# Patient Record
Sex: FEMALE | Race: WHITE | NOT HISPANIC OR LATINO | Employment: OTHER | URBAN - METROPOLITAN AREA
[De-identification: names, ages, dates, MRNs, and addresses within clinical notes are randomized per-mention and may not be internally consistent; named-entity substitution may affect disease eponyms.]

---

## 2020-05-13 ENCOUNTER — TRANSFERRED RECORDS (OUTPATIENT)
Dept: HEALTH INFORMATION MANAGEMENT | Facility: CLINIC | Age: 50
End: 2020-05-13
Payer: COMMERCIAL

## 2020-05-13 LAB
ALT SERPL-CCNC: 64 U/L (ref 6–29)
AST SERPL-CCNC: 58 U/L (ref 10–35)
CREATININE (EXTERNAL): 0.9 MG/DL (ref 0.5–1.1)
GFR ESTIMATED (EXTERNAL): 76 ML/MIN/1.73M2
GFR ESTIMATED (IF AFRICAN AMERICAN) (EXTERNAL): 88 ML/MIN/1.73M2
GLUCOSE (EXTERNAL): 104 MG/DL (ref 65–99)
POTASSIUM (EXTERNAL): 5 MMOL/L (ref 3.5–5.3)

## 2020-11-17 LAB
ALT SERPL-CCNC: 26 U/L (ref 6–29)
AST SERPL-CCNC: 27 U/L (ref 10–35)
CREATININE (EXTERNAL): 0.97 MG/DL (ref 0.5–1.1)
GFR ESTIMATED (EXTERNAL): 69 ML/MIN/1.73M2
GFR ESTIMATED (IF AFRICAN AMERICAN) (EXTERNAL): 80 ML/MIN/1.73M2
GLUCOSE (EXTERNAL): 121 MG/DL (ref 65–99)
POTASSIUM (EXTERNAL): 4.4 MMOL/L (ref 3.5–5.3)

## 2020-11-19 ENCOUNTER — TRANSFERRED RECORDS (OUTPATIENT)
Dept: HEALTH INFORMATION MANAGEMENT | Facility: CLINIC | Age: 50
End: 2020-11-19
Payer: COMMERCIAL

## 2021-01-01 ENCOUNTER — RECORDS - HEALTHEAST (OUTPATIENT)
Dept: ADMINISTRATIVE | Facility: CLINIC | Age: 51
End: 2021-01-01

## 2021-01-01 ENCOUNTER — TRANSFERRED RECORDS (OUTPATIENT)
Dept: HEALTH INFORMATION MANAGEMENT | Facility: CLINIC | Age: 51
End: 2021-01-01
Payer: COMMERCIAL

## 2021-01-01 LAB
ALT SERPL-CCNC: 77 U/L (ref 6–29)
AST SERPL-CCNC: 114 U/L (ref 10–35)
CREATININE (EXTERNAL): 0.96 MG/DL (ref 0.5–1.1)
GFR ESTIMATED (EXTERNAL): 69 ML/MIN/1.73M2
GFR ESTIMATED (IF AFRICAN AMERICAN) (EXTERNAL): 80 ML/MIN/1.73M2
GLUCOSE (EXTERNAL): 100 MG/DL (ref 65–99)
POTASSIUM (EXTERNAL): 4.3 MMOL/L (ref 3.5–5.3)

## 2021-04-27 ENCOUNTER — TRANSFERRED RECORDS (OUTPATIENT)
Dept: HEALTH INFORMATION MANAGEMENT | Facility: CLINIC | Age: 51
End: 2021-04-27
Payer: COMMERCIAL

## 2022-01-01 ENCOUNTER — TELEPHONE (OUTPATIENT)
Dept: NEUROSURGERY | Facility: CLINIC | Age: 52
End: 2022-01-01
Payer: COMMERCIAL

## 2022-01-01 ENCOUNTER — MEDICAL CORRESPONDENCE (OUTPATIENT)
Dept: HEALTH INFORMATION MANAGEMENT | Facility: CLINIC | Age: 52
End: 2022-01-01
Payer: COMMERCIAL

## 2022-01-01 ENCOUNTER — PRE VISIT (OUTPATIENT)
Dept: NEUROSURGERY | Facility: CLINIC | Age: 52
End: 2022-01-01

## 2022-01-01 ENCOUNTER — APPOINTMENT (OUTPATIENT)
Dept: CT IMAGING | Facility: CLINIC | Age: 52
End: 2022-01-01
Attending: EMERGENCY MEDICINE
Payer: COMMERCIAL

## 2022-01-01 ENCOUNTER — TRANSFERRED RECORDS (OUTPATIENT)
Dept: HEALTH INFORMATION MANAGEMENT | Facility: CLINIC | Age: 52
End: 2022-01-01
Payer: COMMERCIAL

## 2022-01-01 ENCOUNTER — APPOINTMENT (OUTPATIENT)
Dept: GENERAL RADIOLOGY | Facility: CLINIC | Age: 52
End: 2022-01-01
Attending: EMERGENCY MEDICINE
Payer: COMMERCIAL

## 2022-01-01 ENCOUNTER — VIRTUAL VISIT (OUTPATIENT)
Dept: NEUROSURGERY | Facility: CLINIC | Age: 52
End: 2022-01-01
Attending: PSYCHIATRY & NEUROLOGY
Payer: COMMERCIAL

## 2022-01-01 ENCOUNTER — TRANSCRIBE ORDERS (OUTPATIENT)
Dept: OTHER | Age: 52
End: 2022-01-01

## 2022-01-01 ENCOUNTER — TELEPHONE (OUTPATIENT)
Dept: OTOLARYNGOLOGY | Facility: CLINIC | Age: 52
End: 2022-01-01
Payer: COMMERCIAL

## 2022-01-01 ENCOUNTER — ANESTHESIA EVENT (OUTPATIENT)
Dept: SURGERY | Facility: CLINIC | Age: 52
End: 2022-01-01
Payer: COMMERCIAL

## 2022-01-01 ENCOUNTER — ANESTHESIA (OUTPATIENT)
Dept: SURGERY | Facility: CLINIC | Age: 52
End: 2022-01-01
Payer: COMMERCIAL

## 2022-01-01 ENCOUNTER — APPOINTMENT (OUTPATIENT)
Dept: CARDIOLOGY | Facility: CLINIC | Age: 52
End: 2022-01-01
Attending: EMERGENCY MEDICINE
Payer: COMMERCIAL

## 2022-01-01 ENCOUNTER — HOSPITAL ENCOUNTER (INPATIENT)
Facility: CLINIC | Age: 52
LOS: 2 days | End: 2022-05-26
Attending: EMERGENCY MEDICINE | Admitting: INTERNAL MEDICINE
Payer: COMMERCIAL

## 2022-01-01 ENCOUNTER — APPOINTMENT (OUTPATIENT)
Dept: ULTRASOUND IMAGING | Facility: CLINIC | Age: 52
End: 2022-01-01
Attending: INTERNAL MEDICINE
Payer: COMMERCIAL

## 2022-01-01 VITALS
WEIGHT: 235.45 LBS | DIASTOLIC BLOOD PRESSURE: 20 MMHG | OXYGEN SATURATION: 97 % | HEIGHT: 58 IN | TEMPERATURE: 100.04 F | RESPIRATION RATE: 14 BRPM | SYSTOLIC BLOOD PRESSURE: 20 MMHG | HEART RATE: 120 BPM | BODY MASS INDEX: 49.42 KG/M2

## 2022-01-01 DIAGNOSIS — G50.0 TRIGEMINAL NEURALGIA: Primary | ICD-10-CM

## 2022-01-01 DIAGNOSIS — G50.1 ATYPICAL FACIAL PAIN: Primary | ICD-10-CM

## 2022-01-01 DIAGNOSIS — I46.9 CARDIAC ARREST (H): ICD-10-CM

## 2022-01-01 DIAGNOSIS — G93.1 ANOXIC BRAIN DAMAGE (H): ICD-10-CM

## 2022-01-01 DIAGNOSIS — S22.42XA CLOSED FRACTURE OF MULTIPLE RIBS OF LEFT SIDE, INITIAL ENCOUNTER: ICD-10-CM

## 2022-01-01 LAB
ABO/RH(D): NORMAL
ALBUMIN SERPL-MCNC: 1.9 G/DL (ref 3.4–5)
ALBUMIN SERPL-MCNC: 2 G/DL (ref 3.4–5)
ALBUMIN SERPL-MCNC: 2 G/DL (ref 3.4–5)
ALBUMIN SERPL-MCNC: 2.2 G/DL (ref 3.4–5)
ALBUMIN SERPL-MCNC: 2.3 G/DL (ref 3.4–5)
ALBUMIN SERPL-MCNC: 2.4 G/DL (ref 3.4–5)
ALBUMIN SERPL-MCNC: 2.5 G/DL (ref 3.4–5)
ALBUMIN SERPL-MCNC: 2.6 G/DL (ref 3.4–5)
ALBUMIN SERPL-MCNC: 2.8 G/DL (ref 3.4–5)
ALBUMIN UR-MCNC: 10 MG/DL
ALBUMIN UR-MCNC: 100 MG/DL
ALBUMIN UR-MCNC: 100 MG/DL
ALP SERPL-CCNC: 110 U/L (ref 40–150)
ALP SERPL-CCNC: 114 U/L (ref 40–150)
ALP SERPL-CCNC: 119 U/L (ref 40–150)
ALP SERPL-CCNC: 120 U/L (ref 40–150)
ALP SERPL-CCNC: 126 U/L (ref 40–150)
ALP SERPL-CCNC: 134 U/L (ref 40–150)
ALP SERPL-CCNC: 136 U/L (ref 40–150)
ALP SERPL-CCNC: 151 U/L (ref 40–150)
ALP SERPL-CCNC: 217 U/L (ref 40–150)
ALT SERPL W P-5'-P-CCNC: 101 U/L (ref 0–50)
ALT SERPL W P-5'-P-CCNC: 1109 U/L (ref 0–50)
ALT SERPL W P-5'-P-CCNC: 1131 U/L (ref 0–50)
ALT SERPL W P-5'-P-CCNC: 1166 U/L (ref 0–50)
ALT SERPL W P-5'-P-CCNC: 119 U/L (ref 0–50)
ALT SERPL W P-5'-P-CCNC: 1230 U/L (ref 0–50)
ALT SERPL W P-5'-P-CCNC: 451 U/L (ref 0–50)
ALT SERPL W P-5'-P-CCNC: 58 U/L (ref 0–50)
ALT SERPL W P-5'-P-CCNC: 833 U/L (ref 0–50)
AMYLASE SERPL-CCNC: 207 U/L (ref 30–110)
AMYLASE SERPL-CCNC: 215 U/L (ref 30–110)
AMYLASE SERPL-CCNC: 240 U/L (ref 30–110)
AMYLASE SERPL-CCNC: 289 U/L (ref 30–110)
AMYLASE SERPL-CCNC: 365 U/L (ref 30–110)
AMYLASE SERPL-CCNC: 514 U/L (ref 30–110)
AMYLASE SERPL-CCNC: 526 U/L (ref 30–110)
ANION GAP SERPL CALCULATED.3IONS-SCNC: 10 MMOL/L (ref 3–14)
ANION GAP SERPL CALCULATED.3IONS-SCNC: 10 MMOL/L (ref 3–14)
ANION GAP SERPL CALCULATED.3IONS-SCNC: 11 MMOL/L (ref 3–14)
ANION GAP SERPL CALCULATED.3IONS-SCNC: 11 MMOL/L (ref 3–14)
ANION GAP SERPL CALCULATED.3IONS-SCNC: 15 MMOL/L (ref 3–14)
ANION GAP SERPL CALCULATED.3IONS-SCNC: 15 MMOL/L (ref 3–14)
ANION GAP SERPL CALCULATED.3IONS-SCNC: 19 MMOL/L (ref 3–14)
ANION GAP SERPL CALCULATED.3IONS-SCNC: 20 MMOL/L (ref 3–14)
ANION GAP SERPL CALCULATED.3IONS-SCNC: 8 MMOL/L (ref 3–14)
ANION GAP SERPL CALCULATED.3IONS-SCNC: 9 MMOL/L (ref 3–14)
ANION GAP SERPL CALCULATED.3IONS-SCNC: 9 MMOL/L (ref 3–14)
ANTIBODY SCREEN: NEGATIVE
APPEARANCE UR: ABNORMAL
APPEARANCE UR: CLEAR
APPEARANCE UR: CLEAR
APTT PPP: 28 SECONDS (ref 22–38)
APTT PPP: 30 SECONDS (ref 22–38)
APTT PPP: 30 SECONDS (ref 22–38)
APTT PPP: 31 SECONDS (ref 22–38)
APTT PPP: 34 SECONDS (ref 22–38)
APTT PPP: 36 SECONDS (ref 22–38)
AST SERPL W P-5'-P-CCNC: 1266 U/L (ref 0–45)
AST SERPL W P-5'-P-CCNC: 137 U/L (ref 0–45)
AST SERPL W P-5'-P-CCNC: 1717 U/L (ref 0–45)
AST SERPL W P-5'-P-CCNC: 1750 U/L (ref 0–45)
AST SERPL W P-5'-P-CCNC: 230 U/L (ref 0–45)
AST SERPL W P-5'-P-CCNC: 64 U/L (ref 0–45)
AST SERPL W P-5'-P-CCNC: 887 U/L (ref 0–45)
AST SERPL W P-5'-P-CCNC: 942 U/L (ref 0–45)
AST SERPL W P-5'-P-CCNC: >1000 U/L (ref 0–45)
ATRIAL RATE - MUSE: 102 BPM
B-HCG SERPL-ACNC: <1 IU/L (ref 0–5)
BACTERIA #/AREA URNS HPF: ABNORMAL /HPF
BACTERIA UR CULT: NO GROWTH
BACTERIA UR CULT: NO GROWTH
BASE EXCESS BLDA CALC-SCNC: -1.6 MMOL/L (ref -9–1.8)
BASE EXCESS BLDA CALC-SCNC: -4 MMOL/L (ref -9–1.8)
BASE EXCESS BLDA CALC-SCNC: -4.1 MMOL/L (ref -9–1.8)
BASE EXCESS BLDA CALC-SCNC: -4.1 MMOL/L (ref -9–1.8)
BASE EXCESS BLDA CALC-SCNC: -8 MMOL/L (ref -9–1.8)
BASE EXCESS BLDA CALC-SCNC: 0.2 MMOL/L (ref -9–1.8)
BASE EXCESS BLDA CALC-SCNC: 2.3 MMOL/L (ref -9–1.8)
BASE EXCESS BLDA CALC-SCNC: 4.1 MMOL/L (ref -9–1.8)
BASE EXCESS BLDA CALC-SCNC: 6.7 MMOL/L (ref -9–1.8)
BASE EXCESS BLDV CALC-SCNC: -7.4 MMOL/L (ref -7.7–1.9)
BASOPHILS # BLD AUTO: 0 10E3/UL (ref 0–0.2)
BASOPHILS # BLD AUTO: 0.1 10E3/UL (ref 0–0.2)
BASOPHILS # BLD MANUAL: 0 10E3/UL (ref 0–0.2)
BASOPHILS NFR BLD AUTO: 0 %
BASOPHILS NFR BLD MANUAL: 0 %
BILIRUB DIRECT SERPL-MCNC: 0.5 MG/DL (ref 0–0.2)
BILIRUB DIRECT SERPL-MCNC: 0.6 MG/DL (ref 0–0.2)
BILIRUB DIRECT SERPL-MCNC: 0.6 MG/DL (ref 0–0.2)
BILIRUB DIRECT SERPL-MCNC: 0.7 MG/DL (ref 0–0.2)
BILIRUB DIRECT SERPL-MCNC: 0.7 MG/DL (ref 0–0.2)
BILIRUB DIRECT SERPL-MCNC: 0.8 MG/DL (ref 0–0.2)
BILIRUB SERPL-MCNC: 0.2 MG/DL (ref 0.2–1.3)
BILIRUB SERPL-MCNC: 0.8 MG/DL (ref 0.2–1.3)
BILIRUB SERPL-MCNC: 1 MG/DL (ref 0.2–1.3)
BILIRUB SERPL-MCNC: 1.1 MG/DL (ref 0.2–1.3)
BILIRUB SERPL-MCNC: 1.2 MG/DL (ref 0.2–1.3)
BILIRUB SERPL-MCNC: 1.3 MG/DL (ref 0.2–1.3)
BILIRUB SERPL-MCNC: 1.4 MG/DL (ref 0.2–1.3)
BILIRUB SERPL-MCNC: 1.6 MG/DL (ref 0.2–1.3)
BILIRUB SERPL-MCNC: 1.7 MG/DL (ref 0.2–1.3)
BILIRUB UR QL STRIP: NEGATIVE
BLD PROD TYP BPU: NORMAL
BLOOD COMPONENT TYPE: NORMAL
BUN SERPL-MCNC: 14 MG/DL (ref 7–30)
BUN SERPL-MCNC: 17 MG/DL (ref 7–30)
BUN SERPL-MCNC: 18 MG/DL (ref 7–30)
BUN SERPL-MCNC: 19 MG/DL (ref 7–30)
BUN SERPL-MCNC: 20 MG/DL (ref 7–30)
BUN SERPL-MCNC: 20 MG/DL (ref 7–30)
BUN SERPL-MCNC: 24 MG/DL (ref 7–30)
BUN SERPL-MCNC: 25 MG/DL (ref 7–30)
BUN SERPL-MCNC: 26 MG/DL (ref 7–30)
CA-I BLD-MCNC: 3.8 MG/DL (ref 4.4–5.2)
CA-I BLD-MCNC: 3.8 MG/DL (ref 4.4–5.2)
CA-I BLD-MCNC: 4 MG/DL (ref 4.4–5.2)
CA-I BLD-MCNC: 4.1 MG/DL (ref 4.4–5.2)
CA-I BLD-MCNC: 4.2 MG/DL (ref 4.4–5.2)
CALCIUM SERPL-MCNC: 10.8 MG/DL (ref 8.5–10.1)
CALCIUM SERPL-MCNC: 7.1 MG/DL (ref 8.5–10.1)
CALCIUM SERPL-MCNC: 7.2 MG/DL (ref 8.5–10.1)
CALCIUM SERPL-MCNC: 7.2 MG/DL (ref 8.5–10.1)
CALCIUM SERPL-MCNC: 7.3 MG/DL (ref 8.5–10.1)
CALCIUM SERPL-MCNC: 8 MG/DL (ref 8.5–10.1)
CALCIUM SERPL-MCNC: 8.6 MG/DL (ref 8.5–10.1)
CHLORIDE BLD-SCNC: 101 MMOL/L (ref 94–109)
CHLORIDE BLD-SCNC: 103 MMOL/L (ref 94–109)
CHLORIDE BLD-SCNC: 105 MMOL/L (ref 94–109)
CHLORIDE BLD-SCNC: 106 MMOL/L (ref 94–109)
CHLORIDE BLD-SCNC: 106 MMOL/L (ref 94–109)
CHLORIDE BLD-SCNC: 107 MMOL/L (ref 94–109)
CHLORIDE BLD-SCNC: 108 MMOL/L (ref 94–109)
CHLORIDE BLD-SCNC: 115 MMOL/L (ref 94–109)
CHLORIDE BLD-SCNC: 99 MMOL/L (ref 94–109)
CK SERPL-CCNC: 1034 U/L (ref 30–225)
CK SERPL-CCNC: 1302 U/L (ref 30–225)
CK SERPL-CCNC: 1668 U/L (ref 30–225)
CK SERPL-CCNC: 2073 U/L (ref 30–225)
CK SERPL-CCNC: 2248 U/L (ref 30–225)
CK SERPL-CCNC: 305 U/L (ref 30–225)
CO2 SERPL-SCNC: 18 MMOL/L (ref 20–32)
CO2 SERPL-SCNC: 18 MMOL/L (ref 20–32)
CO2 SERPL-SCNC: 19 MMOL/L (ref 20–32)
CO2 SERPL-SCNC: 20 MMOL/L (ref 20–32)
CO2 SERPL-SCNC: 21 MMOL/L (ref 20–32)
CO2 SERPL-SCNC: 24 MMOL/L (ref 20–32)
CO2 SERPL-SCNC: 26 MMOL/L (ref 20–32)
CO2 SERPL-SCNC: 27 MMOL/L (ref 20–32)
CODING SYSTEM: NORMAL
COLOR UR AUTO: ABNORMAL
COLOR UR AUTO: ABNORMAL
COLOR UR AUTO: YELLOW
CPB POCT: NO
CREAT SERPL-MCNC: 0.99 MG/DL (ref 0.52–1.04)
CREAT SERPL-MCNC: 1 MG/DL (ref 0.52–1.04)
CREAT SERPL-MCNC: 1.04 MG/DL (ref 0.52–1.04)
CREAT SERPL-MCNC: 1.09 MG/DL (ref 0.52–1.04)
CREAT SERPL-MCNC: 1.13 MG/DL (ref 0.52–1.04)
CREAT SERPL-MCNC: 1.13 MG/DL (ref 0.52–1.04)
CREAT SERPL-MCNC: 1.32 MG/DL (ref 0.52–1.04)
CREAT SERPL-MCNC: 1.33 MG/DL (ref 0.52–1.04)
CREAT SERPL-MCNC: 1.33 MG/DL (ref 0.52–1.04)
CROSSMATCH: NORMAL
DIASTOLIC BLOOD PRESSURE - MUSE: NORMAL MMHG
ENTEROCOCCUS FAECALIS: NOT DETECTED
ENTEROCOCCUS FAECIUM: NOT DETECTED
EOSINOPHIL # BLD AUTO: 0 10E3/UL (ref 0–0.7)
EOSINOPHIL # BLD AUTO: 0 10E3/UL (ref 0–0.7)
EOSINOPHIL # BLD AUTO: 0.1 10E3/UL (ref 0–0.7)
EOSINOPHIL # BLD AUTO: 0.1 10E3/UL (ref 0–0.7)
EOSINOPHIL # BLD AUTO: 0.3 10E3/UL (ref 0–0.7)
EOSINOPHIL # BLD MANUAL: 0 10E3/UL (ref 0–0.7)
EOSINOPHIL # BLD MANUAL: 0 10E3/UL (ref 0–0.7)
EOSINOPHIL # BLD MANUAL: 0.2 10E3/UL (ref 0–0.7)
EOSINOPHIL NFR BLD AUTO: 0 %
EOSINOPHIL NFR BLD AUTO: 1 %
EOSINOPHIL NFR BLD AUTO: 1 %
EOSINOPHIL NFR BLD MANUAL: 0 %
EOSINOPHIL NFR BLD MANUAL: 0 %
EOSINOPHIL NFR BLD MANUAL: 1 %
ERYTHROCYTE [DISTWIDTH] IN BLOOD BY AUTOMATED COUNT: 15.5 % (ref 10–15)
ERYTHROCYTE [DISTWIDTH] IN BLOOD BY AUTOMATED COUNT: 15.5 % (ref 10–15)
ERYTHROCYTE [DISTWIDTH] IN BLOOD BY AUTOMATED COUNT: 15.8 % (ref 10–15)
ERYTHROCYTE [DISTWIDTH] IN BLOOD BY AUTOMATED COUNT: 15.9 % (ref 10–15)
ERYTHROCYTE [DISTWIDTH] IN BLOOD BY AUTOMATED COUNT: 16 % (ref 10–15)
ERYTHROCYTE [DISTWIDTH] IN BLOOD BY AUTOMATED COUNT: 16 % (ref 10–15)
ERYTHROCYTE [DISTWIDTH] IN BLOOD BY AUTOMATED COUNT: 16.5 % (ref 10–15)
ERYTHROCYTE [DISTWIDTH] IN BLOOD BY AUTOMATED COUNT: 16.5 % (ref 10–15)
FIBRINOGEN PPP-MCNC: 355 MG/DL (ref 170–490)
GFR SERPL CREATININE-BSD FRML MDRD: 49 ML/MIN/1.73M2
GFR SERPL CREATININE-BSD FRML MDRD: 59 ML/MIN/1.73M2
GFR SERPL CREATININE-BSD FRML MDRD: 59 ML/MIN/1.73M2
GFR SERPL CREATININE-BSD FRML MDRD: 62 ML/MIN/1.73M2
GFR SERPL CREATININE-BSD FRML MDRD: 66 ML/MIN/1.73M2
GFR SERPL CREATININE-BSD FRML MDRD: 69 ML/MIN/1.73M2
GFR SERPL CREATININE-BSD FRML MDRD: 70 ML/MIN/1.73M2
GGT SERPL-CCNC: 346 U/L (ref 0–40)
GGT SERPL-CCNC: 355 U/L (ref 0–40)
GGT SERPL-CCNC: 357 U/L (ref 0–40)
GGT SERPL-CCNC: 372 U/L (ref 0–40)
GGT SERPL-CCNC: 384 U/L (ref 0–40)
GGT SERPL-CCNC: 389 U/L (ref 0–40)
GLUCOSE BLD-MCNC: 131 MG/DL (ref 70–99)
GLUCOSE BLD-MCNC: 139 MG/DL (ref 70–99)
GLUCOSE BLD-MCNC: 143 MG/DL (ref 70–99)
GLUCOSE BLD-MCNC: 146 MG/DL (ref 70–99)
GLUCOSE BLD-MCNC: 168 MG/DL (ref 70–99)
GLUCOSE BLD-MCNC: 170 MG/DL (ref 70–99)
GLUCOSE BLD-MCNC: 254 MG/DL (ref 70–99)
GLUCOSE BLD-MCNC: 289 MG/DL (ref 70–99)
GLUCOSE BLD-MCNC: 318 MG/DL (ref 70–99)
GLUCOSE BLDC GLUCOMTR-MCNC: 112 MG/DL (ref 70–99)
GLUCOSE BLDC GLUCOMTR-MCNC: 117 MG/DL (ref 70–99)
GLUCOSE BLDC GLUCOMTR-MCNC: 117 MG/DL (ref 70–99)
GLUCOSE BLDC GLUCOMTR-MCNC: 118 MG/DL (ref 70–99)
GLUCOSE BLDC GLUCOMTR-MCNC: 126 MG/DL (ref 70–99)
GLUCOSE BLDC GLUCOMTR-MCNC: 127 MG/DL (ref 70–99)
GLUCOSE BLDC GLUCOMTR-MCNC: 128 MG/DL (ref 70–99)
GLUCOSE BLDC GLUCOMTR-MCNC: 130 MG/DL (ref 70–99)
GLUCOSE BLDC GLUCOMTR-MCNC: 134 MG/DL (ref 70–99)
GLUCOSE BLDC GLUCOMTR-MCNC: 139 MG/DL (ref 70–99)
GLUCOSE BLDC GLUCOMTR-MCNC: 141 MG/DL (ref 70–99)
GLUCOSE BLDC GLUCOMTR-MCNC: 145 MG/DL (ref 70–99)
GLUCOSE BLDC GLUCOMTR-MCNC: 150 MG/DL (ref 70–99)
GLUCOSE BLDC GLUCOMTR-MCNC: 151 MG/DL (ref 70–99)
GLUCOSE BLDC GLUCOMTR-MCNC: 155 MG/DL (ref 70–99)
GLUCOSE BLDC GLUCOMTR-MCNC: 203 MG/DL (ref 70–99)
GLUCOSE BLDC GLUCOMTR-MCNC: 245 MG/DL (ref 70–99)
GLUCOSE BLDC GLUCOMTR-MCNC: 273 MG/DL (ref 70–99)
GLUCOSE BLDC GLUCOMTR-MCNC: 284 MG/DL (ref 70–99)
GLUCOSE BLDC GLUCOMTR-MCNC: 327 MG/DL (ref 70–99)
GLUCOSE UR STRIP-MCNC: 50 MG/DL
GLUCOSE UR STRIP-MCNC: NEGATIVE MG/DL
GLUCOSE UR STRIP-MCNC: NEGATIVE MG/DL
HBA1C MFR BLD: 6.4 % (ref 0–5.6)
HCO3 BLD-SCNC: 19 MMOL/L (ref 21–28)
HCO3 BLD-SCNC: 20 MMOL/L (ref 21–28)
HCO3 BLD-SCNC: 21 MMOL/L (ref 21–28)
HCO3 BLD-SCNC: 21 MMOL/L (ref 21–28)
HCO3 BLD-SCNC: 22 MMOL/L (ref 21–28)
HCO3 BLD-SCNC: 24 MMOL/L (ref 21–28)
HCO3 BLD-SCNC: 25 MMOL/L (ref 21–28)
HCO3 BLD-SCNC: 26 MMOL/L (ref 21–28)
HCO3 BLD-SCNC: 27 MMOL/L (ref 21–28)
HCO3 BLDV-SCNC: 20 MMOL/L (ref 21–28)
HCO3 BLDV-SCNC: 23 MMOL/L (ref 21–28)
HCO3 BLDV-SCNC: ABNORMAL MMOL/L
HCT VFR BLD AUTO: 35.4 % (ref 35–47)
HCT VFR BLD AUTO: 37.1 % (ref 35–47)
HCT VFR BLD AUTO: 38.3 % (ref 35–47)
HCT VFR BLD AUTO: 40.1 % (ref 35–47)
HCT VFR BLD AUTO: 40.4 % (ref 35–47)
HCT VFR BLD AUTO: 41.7 % (ref 35–47)
HCT VFR BLD AUTO: 42.4 % (ref 35–47)
HCT VFR BLD AUTO: 43.9 % (ref 35–47)
HCT VFR BLD CALC: 38 % (ref 35–47)
HGB BLD-MCNC: 11.4 G/DL (ref 11.7–15.7)
HGB BLD-MCNC: 11.5 G/DL (ref 11.7–15.7)
HGB BLD-MCNC: 11.8 G/DL (ref 11.7–15.7)
HGB BLD-MCNC: 11.9 G/DL (ref 11.7–15.7)
HGB BLD-MCNC: 12.9 G/DL (ref 11.7–15.7)
HGB BLD-MCNC: 12.9 G/DL (ref 11.7–15.7)
HGB BLD-MCNC: 13.1 G/DL (ref 11.7–15.7)
HGB BLD-MCNC: 13.6 G/DL (ref 11.7–15.7)
HGB BLD-MCNC: 13.8 G/DL (ref 11.7–15.7)
HGB UR QL STRIP: ABNORMAL
HOLD SPECIMEN: NORMAL
HYALINE CASTS: 2 /LPF
IMM GRANULOCYTES # BLD: 0.1 10E3/UL
IMM GRANULOCYTES # BLD: 0.2 10E3/UL
IMM GRANULOCYTES # BLD: 0.3 10E3/UL
IMM GRANULOCYTES NFR BLD: 1 %
INR PPP: 1.28 (ref 0.85–1.15)
INR PPP: 1.36 (ref 0.85–1.15)
INR PPP: 1.44 (ref 0.85–1.15)
INR PPP: 1.45 (ref 0.85–1.15)
INR PPP: 1.51 (ref 0.85–1.15)
INR PPP: 1.62 (ref 0.85–1.15)
INR PPP: 1.67 (ref 0.85–1.15)
INTERPRETATION ECG - MUSE: NORMAL
KETONES UR STRIP-MCNC: 10 MG/DL
KETONES UR STRIP-MCNC: NEGATIVE MG/DL
KETONES UR STRIP-MCNC: NEGATIVE MG/DL
LACTATE BLD-SCNC: 16.2 MMOL/L
LACTATE SERPL-SCNC: 1.3 MMOL/L (ref 0.7–2)
LACTATE SERPL-SCNC: 1.6 MMOL/L (ref 0.7–2)
LACTATE SERPL-SCNC: 15 MMOL/L (ref 0.7–2)
LACTATE SERPL-SCNC: 2.2 MMOL/L (ref 0.7–2)
LACTATE SERPL-SCNC: 2.2 MMOL/L (ref 0.7–2)
LACTATE SERPL-SCNC: 3 MMOL/L (ref 0.7–2)
LACTATE SERPL-SCNC: 6.1 MMOL/L (ref 0.7–2)
LDH SERPL L TO P-CCNC: 1102 U/L (ref 81–234)
LDH SERPL L TO P-CCNC: 1388 U/L (ref 81–234)
LDH SERPL L TO P-CCNC: 1514 U/L (ref 81–234)
LDH SERPL L TO P-CCNC: 1859 U/L (ref 81–234)
LDH SERPL L TO P-CCNC: 2241 U/L (ref 81–234)
LDH SERPL L TO P-CCNC: 750 U/L (ref 81–234)
LDH SERPL L TO P-CCNC: >1000 U/L (ref 81–234)
LEUKOCYTE ESTERASE UR QL STRIP: NEGATIVE
LIPASE SERPL-CCNC: 101 U/L (ref 73–393)
LIPASE SERPL-CCNC: 109 U/L (ref 73–393)
LIPASE SERPL-CCNC: 115 U/L (ref 73–393)
LIPASE SERPL-CCNC: 141 U/L (ref 73–393)
LIPASE SERPL-CCNC: 148 U/L (ref 73–393)
LIPASE SERPL-CCNC: 247 U/L (ref 73–393)
LIPASE SERPL-CCNC: 268 U/L (ref 73–393)
LISTERIA SPECIES (DETECTED/NOT DETECTED): NOT DETECTED
LVEF ECHO: NORMAL
LYMPHOCYTES # BLD AUTO: 1.2 10E3/UL (ref 0.8–5.3)
LYMPHOCYTES # BLD AUTO: 1.8 10E3/UL (ref 0.8–5.3)
LYMPHOCYTES # BLD AUTO: 2 10E3/UL (ref 0.8–5.3)
LYMPHOCYTES # BLD AUTO: 2.5 10E3/UL (ref 0.8–5.3)
LYMPHOCYTES # BLD AUTO: 2.8 10E3/UL (ref 0.8–5.3)
LYMPHOCYTES # BLD MANUAL: 1.8 10E3/UL (ref 0.8–5.3)
LYMPHOCYTES # BLD MANUAL: 2.3 10E3/UL (ref 0.8–5.3)
LYMPHOCYTES # BLD MANUAL: 6.9 10E3/UL (ref 0.8–5.3)
LYMPHOCYTES NFR BLD AUTO: 10 %
LYMPHOCYTES NFR BLD AUTO: 11 %
LYMPHOCYTES NFR BLD AUTO: 12 %
LYMPHOCYTES NFR BLD AUTO: 13 %
LYMPHOCYTES NFR BLD AUTO: 7 %
LYMPHOCYTES NFR BLD MANUAL: 11 %
LYMPHOCYTES NFR BLD MANUAL: 11 %
LYMPHOCYTES NFR BLD MANUAL: 44 %
MAGNESIUM SERPL-MCNC: 1.5 MG/DL (ref 1.6–2.3)
MAGNESIUM SERPL-MCNC: 1.6 MG/DL (ref 1.6–2.3)
MAGNESIUM SERPL-MCNC: 2.6 MG/DL (ref 1.6–2.3)
MAGNESIUM SERPL-MCNC: 2.6 MG/DL (ref 1.6–2.3)
MAGNESIUM SERPL-MCNC: 2.9 MG/DL (ref 1.6–2.3)
MCH RBC QN AUTO: 29 PG (ref 26.5–33)
MCH RBC QN AUTO: 29 PG (ref 26.5–33)
MCH RBC QN AUTO: 29.1 PG (ref 26.5–33)
MCH RBC QN AUTO: 29.4 PG (ref 26.5–33)
MCH RBC QN AUTO: 29.4 PG (ref 26.5–33)
MCH RBC QN AUTO: 29.5 PG (ref 26.5–33)
MCH RBC QN AUTO: 29.9 PG (ref 26.5–33)
MCH RBC QN AUTO: 30.1 PG (ref 26.5–33)
MCHC RBC AUTO-ENTMCNC: 28.5 G/DL (ref 31.5–36.5)
MCHC RBC AUTO-ENTMCNC: 30.8 G/DL (ref 31.5–36.5)
MCHC RBC AUTO-ENTMCNC: 31.4 G/DL (ref 31.5–36.5)
MCHC RBC AUTO-ENTMCNC: 31.4 G/DL (ref 31.5–36.5)
MCHC RBC AUTO-ENTMCNC: 32.1 G/DL (ref 31.5–36.5)
MCHC RBC AUTO-ENTMCNC: 32.1 G/DL (ref 31.5–36.5)
MCHC RBC AUTO-ENTMCNC: 32.2 G/DL (ref 31.5–36.5)
MCHC RBC AUTO-ENTMCNC: 32.2 G/DL (ref 31.5–36.5)
MCV RBC AUTO: 102 FL (ref 78–100)
MCV RBC AUTO: 92 FL (ref 78–100)
MCV RBC AUTO: 93 FL (ref 78–100)
MCV RBC AUTO: 93 FL (ref 78–100)
MCV RBC AUTO: 94 FL (ref 78–100)
MCV RBC AUTO: 95 FL (ref 78–100)
MONOCYTES # BLD AUTO: 0.5 10E3/UL (ref 0–1.3)
MONOCYTES # BLD AUTO: 0.6 10E3/UL (ref 0–1.3)
MONOCYTES # BLD AUTO: 0.8 10E3/UL (ref 0–1.3)
MONOCYTES # BLD AUTO: 0.8 10E3/UL (ref 0–1.3)
MONOCYTES # BLD AUTO: 1.5 10E3/UL (ref 0–1.3)
MONOCYTES # BLD MANUAL: 0 10E3/UL (ref 0–1.3)
MONOCYTES # BLD MANUAL: 0.2 10E3/UL (ref 0–1.3)
MONOCYTES # BLD MANUAL: 1.1 10E3/UL (ref 0–1.3)
MONOCYTES NFR BLD AUTO: 3 %
MONOCYTES NFR BLD AUTO: 3 %
MONOCYTES NFR BLD AUTO: 4 %
MONOCYTES NFR BLD AUTO: 5 %
MONOCYTES NFR BLD AUTO: 6 %
MONOCYTES NFR BLD MANUAL: 0 %
MONOCYTES NFR BLD MANUAL: 1 %
MONOCYTES NFR BLD MANUAL: 7 %
MUCOUS THREADS #/AREA URNS LPF: PRESENT /LPF
NEUTROPHILS # BLD AUTO: 13.2 10E3/UL (ref 1.6–8.3)
NEUTROPHILS # BLD AUTO: 15.2 10E3/UL (ref 1.6–8.3)
NEUTROPHILS # BLD AUTO: 16 10E3/UL (ref 1.6–8.3)
NEUTROPHILS # BLD AUTO: 16.2 10E3/UL (ref 1.6–8.3)
NEUTROPHILS # BLD AUTO: 18.7 10E3/UL (ref 1.6–8.3)
NEUTROPHILS # BLD MANUAL: 14.7 10E3/UL (ref 1.6–8.3)
NEUTROPHILS # BLD MANUAL: 18.4 10E3/UL (ref 1.6–8.3)
NEUTROPHILS # BLD MANUAL: 7.5 10E3/UL (ref 1.6–8.3)
NEUTROPHILS NFR BLD AUTO: 80 %
NEUTROPHILS NFR BLD AUTO: 83 %
NEUTROPHILS NFR BLD AUTO: 83 %
NEUTROPHILS NFR BLD AUTO: 84 %
NEUTROPHILS NFR BLD AUTO: 89 %
NEUTROPHILS NFR BLD MANUAL: 48 %
NEUTROPHILS NFR BLD MANUAL: 88 %
NEUTROPHILS NFR BLD MANUAL: 89 %
NITRATE UR QL: NEGATIVE
NRBC # BLD AUTO: 0 10E3/UL
NRBC # BLD AUTO: 0.2 10E3/UL
NRBC BLD AUTO-RTO: 0 /100
NRBC BLD MANUAL-RTO: 1 %
O2/TOTAL GAS SETTING VFR VENT: 100 %
O2/TOTAL GAS SETTING VFR VENT: 40 %
O2/TOTAL GAS SETTING VFR VENT: 60 %
OXYHGB MFR BLD: 90 % (ref 92–100)
OXYHGB MFR BLDV: 75 % (ref 70–75)
P AXIS - MUSE: 33 DEGREES
PCO2 BLD: 25 MM HG (ref 35–45)
PCO2 BLD: 25 MM HG (ref 35–45)
PCO2 BLD: 27 MM HG (ref 35–45)
PCO2 BLD: 27 MM HG (ref 35–45)
PCO2 BLD: 28 MM HG (ref 35–45)
PCO2 BLD: 31 MM HG (ref 35–45)
PCO2 BLD: 37 MM HG (ref 35–45)
PCO2 BLD: 47 MM HG (ref 35–45)
PCO2 BLD: 61 MM HG (ref 35–45)
PCO2 BLDV: 71 MM HG (ref 40–50)
PCO2 BLDV: 82 MM HG (ref 40–50)
PCO2 BLDV: 99 MM HG (ref 40–50)
PH BLD: 7.21 [PH] (ref 7.35–7.45)
PH BLD: 7.23 [PH] (ref 7.35–7.45)
PH BLD: 7.36 [PH] (ref 7.35–7.45)
PH BLD: 7.46 [PH] (ref 7.35–7.45)
PH BLD: 7.49 [PH] (ref 7.35–7.45)
PH BLD: 7.54 [PH] (ref 7.35–7.45)
PH BLD: 7.63 [PH] (ref 7.35–7.45)
PH BLDV: 6.99 [PH] (ref 7.32–7.43)
PH BLDV: 7.11 [PH] (ref 7.32–7.43)
PH BLDV: <7 [PH] (ref 7.32–7.43)
PH UR STRIP: 6 [PH] (ref 5–7)
PH UR STRIP: 6.5 [PH] (ref 5–7)
PH UR STRIP: 8.5 [PH] (ref 5–7)
PHOSPHATE SERPL-MCNC: 0.8 MG/DL (ref 2.5–4.5)
PHOSPHATE SERPL-MCNC: 2.4 MG/DL (ref 2.5–4.5)
PHOSPHATE SERPL-MCNC: 2.7 MG/DL (ref 2.5–4.5)
PHOSPHATE SERPL-MCNC: 2.8 MG/DL (ref 2.5–4.5)
PHOSPHATE SERPL-MCNC: 3.4 MG/DL (ref 2.5–4.5)
PHOSPHATE SERPL-MCNC: 4 MG/DL (ref 2.5–4.5)
PLAT MORPH BLD: ABNORMAL
PLATELET # BLD AUTO: 151 10E3/UL (ref 150–450)
PLATELET # BLD AUTO: 161 10E3/UL (ref 150–450)
PLATELET # BLD AUTO: 176 10E3/UL (ref 150–450)
PLATELET # BLD AUTO: 177 10E3/UL (ref 150–450)
PLATELET # BLD AUTO: 180 10E3/UL (ref 150–450)
PLATELET # BLD AUTO: 193 10E3/UL (ref 150–450)
PLATELET # BLD AUTO: 215 10E3/UL (ref 150–450)
PLATELET # BLD AUTO: 241 10E3/UL (ref 150–450)
PO2 BLD: 121 MM HG (ref 80–105)
PO2 BLD: 124 MM HG (ref 80–105)
PO2 BLD: 126 MM HG (ref 80–105)
PO2 BLD: 127 MM HG (ref 80–105)
PO2 BLD: 73 MM HG (ref 80–105)
PO2 BLD: 84 MM HG (ref 80–105)
PO2 BLD: 89 MM HG (ref 80–105)
PO2 BLD: 90 MM HG (ref 80–105)
PO2 BLD: 97 MM HG (ref 80–105)
PO2 BLDV: 57 MM HG (ref 25–47)
PO2 BLDV: 90 MM HG (ref 25–47)
PO2 BLDV: 94 MM HG (ref 25–47)
POTASSIUM BLD-SCNC: 3 MMOL/L (ref 3.4–5.3)
POTASSIUM BLD-SCNC: 3.3 MMOL/L (ref 3.4–5.3)
POTASSIUM BLD-SCNC: 3.4 MMOL/L (ref 3.4–5.3)
POTASSIUM BLD-SCNC: 3.6 MMOL/L (ref 3.4–5.3)
POTASSIUM BLD-SCNC: 3.8 MMOL/L (ref 3.4–5.3)
POTASSIUM BLD-SCNC: 4.4 MMOL/L (ref 3.4–5.3)
POTASSIUM BLD-SCNC: 4.5 MMOL/L (ref 3.4–5.3)
POTASSIUM BLD-SCNC: 5.3 MMOL/L (ref 3.4–5.3)
POTASSIUM BLD-SCNC: 5.5 MMOL/L (ref 3.4–5.3)
POTASSIUM BLD-SCNC: 5.5 MMOL/L (ref 3.4–5.3)
PR INTERVAL - MUSE: 160 MS
PROT SERPL-MCNC: 5.1 G/DL (ref 6.8–8.8)
PROT SERPL-MCNC: 5.2 G/DL (ref 6.8–8.8)
PROT SERPL-MCNC: 5.5 G/DL (ref 6.8–8.8)
PROT SERPL-MCNC: 5.7 G/DL (ref 6.8–8.8)
PROT SERPL-MCNC: 5.7 G/DL (ref 6.8–8.8)
PROT SERPL-MCNC: 5.8 G/DL (ref 6.8–8.8)
PROT SERPL-MCNC: 5.9 G/DL (ref 6.8–8.8)
PROT SERPL-MCNC: 6.3 G/DL (ref 6.8–8.8)
PROT SERPL-MCNC: 6.6 G/DL (ref 6.8–8.8)
QRS DURATION - MUSE: 96 MS
QT - MUSE: 356 MS
QTC - MUSE: 463 MS
R AXIS - MUSE: 18 DEGREES
RBC # BLD AUTO: 3.79 10E6/UL (ref 3.8–5.2)
RBC # BLD AUTO: 3.97 10E6/UL (ref 3.8–5.2)
RBC # BLD AUTO: 3.98 10E6/UL (ref 3.8–5.2)
RBC # BLD AUTO: 4.05 10E6/UL (ref 3.8–5.2)
RBC # BLD AUTO: 4.37 10E6/UL (ref 3.8–5.2)
RBC # BLD AUTO: 4.45 10E6/UL (ref 3.8–5.2)
RBC # BLD AUTO: 4.62 10E6/UL (ref 3.8–5.2)
RBC # BLD AUTO: 4.76 10E6/UL (ref 3.8–5.2)
RBC MORPH BLD: ABNORMAL
RBC URINE: 1 /HPF
RBC URINE: 3 /HPF
RBC URINE: 6 /HPF
SAO2 % BLDV: 91 % (ref 94–100)
SAO2 % BLDV: ABNORMAL %
SARS-COV-2 RNA RESP QL NAA+PROBE: NEGATIVE
SARS-COV-2 RNA RESP QL NAA+PROBE: NEGATIVE
SODIUM BLD-SCNC: 137 MMOL/L (ref 133–144)
SODIUM SERPL-SCNC: 138 MMOL/L (ref 133–144)
SODIUM SERPL-SCNC: 139 MMOL/L (ref 133–144)
SODIUM SERPL-SCNC: 139 MMOL/L (ref 133–144)
SODIUM SERPL-SCNC: 140 MMOL/L (ref 133–144)
SODIUM SERPL-SCNC: 140 MMOL/L (ref 133–144)
SODIUM SERPL-SCNC: 141 MMOL/L (ref 133–144)
SODIUM SERPL-SCNC: 142 MMOL/L (ref 133–144)
SODIUM SERPL-SCNC: 143 MMOL/L (ref 133–144)
SP GR UR STRIP: 1 (ref 1–1.03)
SP GR UR STRIP: 1.02 (ref 1–1.03)
SP GR UR STRIP: 1.02 (ref 1–1.03)
SPECIMEN EXPIRATION DATE: NORMAL
SQUAMOUS EPITHELIAL: 3 /HPF
SQUAMOUS EPITHELIAL: <1 /HPF
STAPHYLOCOCCUS AUREUS: NOT DETECTED
STAPHYLOCOCCUS EPIDERMIDIS: DETECTED
STAPHYLOCOCCUS LUGDUNENSIS: NOT DETECTED
STREPTOCOCCUS AGALACTIAE: NOT DETECTED
STREPTOCOCCUS ANGINOSUS GROUP: NOT DETECTED
STREPTOCOCCUS PNEUMONIAE: NOT DETECTED
STREPTOCOCCUS PYOGENES: NOT DETECTED
STREPTOCOCCUS SPECIES: NOT DETECTED
SYSTOLIC BLOOD PRESSURE - MUSE: NORMAL MMHG
T AXIS - MUSE: 41 DEGREES
TROPONIN I SERPL HS-MCNC: 11 NG/L
TROPONIN I SERPL HS-MCNC: 265 NG/L
TROPONIN I SERPL HS-MCNC: 342 NG/L
TROPONIN T BLD-MCNC: 0.01 UG/L
UNIT ABO/RH: NORMAL
UNIT NUMBER: NORMAL
UNIT STATUS: NORMAL
UNIT TYPE ISBT: 5100
UROBILINOGEN UR STRIP-MCNC: NORMAL MG/DL
VENTRICULAR RATE- MUSE: 102 BPM
WBC # BLD AUTO: 15.6 10E3/UL (ref 4–11)
WBC # BLD AUTO: 16.1 10E3/UL (ref 4–11)
WBC # BLD AUTO: 16.7 10E3/UL (ref 4–11)
WBC # BLD AUTO: 17.1 10E3/UL (ref 4–11)
WBC # BLD AUTO: 19.4 10E3/UL (ref 4–11)
WBC # BLD AUTO: 19.4 10E3/UL (ref 4–11)
WBC # BLD AUTO: 20.7 10E3/UL (ref 4–11)
WBC # BLD AUTO: 23.6 10E3/UL (ref 4–11)
WBC URINE: 0 /HPF
WBC URINE: 14 /HPF
WBC URINE: 3 /HPF

## 2022-01-01 PROCEDURE — 99291 CRITICAL CARE FIRST HOUR: CPT | Performed by: INTERNAL MEDICINE

## 2022-01-01 PROCEDURE — 250N000011 HC RX IP 250 OP 636: Performed by: EMERGENCY MEDICINE

## 2022-01-01 PROCEDURE — 83735 ASSAY OF MAGNESIUM: CPT | Performed by: INTERNAL MEDICINE

## 2022-01-01 PROCEDURE — 96361 HYDRATE IV INFUSION ADD-ON: CPT

## 2022-01-01 PROCEDURE — 96365 THER/PROPH/DIAG IV INF INIT: CPT

## 2022-01-01 PROCEDURE — 99292 CRITICAL CARE ADDL 30 MIN: CPT | Mod: 25 | Performed by: INTERNAL MEDICINE

## 2022-01-01 PROCEDURE — 87149 DNA/RNA DIRECT PROBE: CPT | Performed by: EMERGENCY MEDICINE

## 2022-01-01 PROCEDURE — 82550 ASSAY OF CK (CPK): CPT | Performed by: INTERNAL MEDICINE

## 2022-01-01 PROCEDURE — 999N000158 HC STATISTIC RCP TIME ED VENT EA 10 MIN

## 2022-01-01 PROCEDURE — 93010 ELECTROCARDIOGRAM REPORT: CPT | Performed by: INTERNAL MEDICINE

## 2022-01-01 PROCEDURE — 81001 URINALYSIS AUTO W/SCOPE: CPT | Performed by: EMERGENCY MEDICINE

## 2022-01-01 PROCEDURE — 84100 ASSAY OF PHOSPHORUS: CPT | Performed by: INTERNAL MEDICINE

## 2022-01-01 PROCEDURE — 84132 ASSAY OF SERUM POTASSIUM: CPT | Performed by: INTERNAL MEDICINE

## 2022-01-01 PROCEDURE — 85730 THROMBOPLASTIN TIME PARTIAL: CPT | Performed by: INTERNAL MEDICINE

## 2022-01-01 PROCEDURE — 82150 ASSAY OF AMYLASE: CPT | Performed by: INTERNAL MEDICINE

## 2022-01-01 PROCEDURE — 99204 OFFICE O/P NEW MOD 45 MIN: CPT | Mod: 95 | Performed by: NURSE PRACTITIONER

## 2022-01-01 PROCEDURE — 84155 ASSAY OF PROTEIN SERUM: CPT | Performed by: INTERNAL MEDICINE

## 2022-01-01 PROCEDURE — 999N000157 HC STATISTIC RCP TIME EA 10 MIN

## 2022-01-01 PROCEDURE — 258N000003 HC RX IP 258 OP 636: Performed by: INTERNAL MEDICINE

## 2022-01-01 PROCEDURE — 84484 ASSAY OF TROPONIN QUANT: CPT | Mod: 91 | Performed by: INTERNAL MEDICINE

## 2022-01-01 PROCEDURE — 82248 BILIRUBIN DIRECT: CPT | Performed by: INTERNAL MEDICINE

## 2022-01-01 PROCEDURE — 85025 COMPLETE CBC W/AUTO DIFF WBC: CPT | Performed by: INTERNAL MEDICINE

## 2022-01-01 PROCEDURE — 36556 INSERT NON-TUNNEL CV CATH: CPT

## 2022-01-01 PROCEDURE — 360N000079 HC SURGERY LEVEL 6, PER MIN

## 2022-01-01 PROCEDURE — 250N000011 HC RX IP 250 OP 636

## 2022-01-01 PROCEDURE — 87040 BLOOD CULTURE FOR BACTERIA: CPT | Performed by: INTERNAL MEDICINE

## 2022-01-01 PROCEDURE — 36415 COLL VENOUS BLD VENIPUNCTURE: CPT | Performed by: EMERGENCY MEDICINE

## 2022-01-01 PROCEDURE — 85027 COMPLETE CBC AUTOMATED: CPT | Performed by: INTERNAL MEDICINE

## 2022-01-01 PROCEDURE — 85007 BL SMEAR W/DIFF WBC COUNT: CPT | Performed by: INTERNAL MEDICINE

## 2022-01-01 PROCEDURE — 250N000011 HC RX IP 250 OP 636: Performed by: INTERNAL MEDICINE

## 2022-01-01 PROCEDURE — 250N000012 HC RX MED GY IP 250 OP 636 PS 637: Performed by: INTERNAL MEDICINE

## 2022-01-01 PROCEDURE — 82803 BLOOD GASES ANY COMBINATION: CPT

## 2022-01-01 PROCEDURE — 86923 COMPATIBILITY TEST ELECTRIC: CPT | Performed by: INTERNAL MEDICINE

## 2022-01-01 PROCEDURE — 82803 BLOOD GASES ANY COMBINATION: CPT | Performed by: INTERNAL MEDICINE

## 2022-01-01 PROCEDURE — 80051 ELECTROLYTE PANEL: CPT | Performed by: INTERNAL MEDICINE

## 2022-01-01 PROCEDURE — 96376 TX/PRO/DX INJ SAME DRUG ADON: CPT

## 2022-01-01 PROCEDURE — 83690 ASSAY OF LIPASE: CPT | Performed by: INTERNAL MEDICINE

## 2022-01-01 PROCEDURE — 84484 ASSAY OF TROPONIN QUANT: CPT

## 2022-01-01 PROCEDURE — 82977 ASSAY OF GGT: CPT | Performed by: INTERNAL MEDICINE

## 2022-01-01 PROCEDURE — 84702 CHORIONIC GONADOTROPIN TEST: CPT | Performed by: INTERNAL MEDICINE

## 2022-01-01 PROCEDURE — 258N000003 HC RX IP 258 OP 636: Performed by: EMERGENCY MEDICINE

## 2022-01-01 PROCEDURE — 999N000185 HC STATISTIC TRANSPORT TIME EA 15 MIN

## 2022-01-01 PROCEDURE — 70450 CT HEAD/BRAIN W/O DYE: CPT

## 2022-01-01 PROCEDURE — 81001 URINALYSIS AUTO W/SCOPE: CPT | Performed by: INTERNAL MEDICINE

## 2022-01-01 PROCEDURE — 85610 PROTHROMBIN TIME: CPT | Performed by: INTERNAL MEDICINE

## 2022-01-01 PROCEDURE — 87070 CULTURE OTHR SPECIMN AEROBIC: CPT | Performed by: INTERNAL MEDICINE

## 2022-01-01 PROCEDURE — 94003 VENT MGMT INPAT SUBQ DAY: CPT

## 2022-01-01 PROCEDURE — 85610 PROTHROMBIN TIME: CPT | Performed by: EMERGENCY MEDICINE

## 2022-01-01 PROCEDURE — 93005 ELECTROCARDIOGRAM TRACING: CPT

## 2022-01-01 PROCEDURE — 85027 COMPLETE CBC AUTOMATED: CPT | Performed by: EMERGENCY MEDICINE

## 2022-01-01 PROCEDURE — 3E043XZ INTRODUCTION OF VASOPRESSOR INTO CENTRAL VEIN, PERCUTANEOUS APPROACH: ICD-10-PCS | Performed by: EMERGENCY MEDICINE

## 2022-01-01 PROCEDURE — 80053 COMPREHEN METABOLIC PANEL: CPT | Performed by: EMERGENCY MEDICINE

## 2022-01-01 PROCEDURE — 250N000009 HC RX 250: Performed by: EMERGENCY MEDICINE

## 2022-01-01 PROCEDURE — 93325 DOPPLER ECHO COLOR FLOW MAPG: CPT | Mod: 26 | Performed by: INTERNAL MEDICINE

## 2022-01-01 PROCEDURE — 86901 BLOOD TYPING SEROLOGIC RH(D): CPT | Performed by: EMERGENCY MEDICINE

## 2022-01-01 PROCEDURE — 82805 BLOOD GASES W/O2 SATURATION: CPT | Performed by: EMERGENCY MEDICINE

## 2022-01-01 PROCEDURE — 83036 HEMOGLOBIN GLYCOSYLATED A1C: CPT | Performed by: INTERNAL MEDICINE

## 2022-01-01 PROCEDURE — 200N000001 HC R&B ICU

## 2022-01-01 PROCEDURE — U0003 INFECTIOUS AGENT DETECTION BY NUCLEIC ACID (DNA OR RNA); SEVERE ACUTE RESPIRATORY SYNDROME CORONAVIRUS 2 (SARS-COV-2) (CORONAVIRUS DISEASE [COVID-19]), AMPLIFIED PROBE TECHNIQUE, MAKING USE OF HIGH THROUGHPUT TECHNOLOGIES AS DESCRIBED BY CMS-2020-01-R: HCPCS | Performed by: INTERNAL MEDICINE

## 2022-01-01 PROCEDURE — 82330 ASSAY OF CALCIUM: CPT | Performed by: INTERNAL MEDICINE

## 2022-01-01 PROCEDURE — 93005 ELECTROCARDIOGRAM TRACING: CPT | Mod: 76

## 2022-01-01 PROCEDURE — 83605 ASSAY OF LACTIC ACID: CPT | Performed by: INTERNAL MEDICINE

## 2022-01-01 PROCEDURE — 272N000001 HC OR GENERAL SUPPLY STERILE

## 2022-01-01 PROCEDURE — 83605 ASSAY OF LACTIC ACID: CPT | Performed by: EMERGENCY MEDICINE

## 2022-01-01 PROCEDURE — 31500 INSERT EMERGENCY AIRWAY: CPT

## 2022-01-01 PROCEDURE — 250N000009 HC RX 250: Performed by: INTERNAL MEDICINE

## 2022-01-01 PROCEDURE — 84484 ASSAY OF TROPONIN QUANT: CPT | Performed by: EMERGENCY MEDICINE

## 2022-01-01 PROCEDURE — 99292 CRITICAL CARE ADDL 30 MIN: CPT

## 2022-01-01 PROCEDURE — 96366 THER/PROPH/DIAG IV INF ADDON: CPT

## 2022-01-01 PROCEDURE — 250N000013 HC RX MED GY IP 250 OP 250 PS 637: Performed by: INTERNAL MEDICINE

## 2022-01-01 PROCEDURE — 93325 DOPPLER ECHO COLOR FLOW MAPG: CPT

## 2022-01-01 PROCEDURE — 85730 THROMBOPLASTIN TIME PARTIAL: CPT | Performed by: EMERGENCY MEDICINE

## 2022-01-01 PROCEDURE — 36620 INSERTION CATHETER ARTERY: CPT | Performed by: INTERNAL MEDICINE

## 2022-01-01 PROCEDURE — 76937 US GUIDE VASCULAR ACCESS: CPT

## 2022-01-01 PROCEDURE — 999N000065 XR CHEST PORT 1 VIEW

## 2022-01-01 PROCEDURE — 87086 URINE CULTURE/COLONY COUNT: CPT | Performed by: INTERNAL MEDICINE

## 2022-01-01 PROCEDURE — 85007 BL SMEAR W/DIFF WBC COUNT: CPT | Performed by: EMERGENCY MEDICINE

## 2022-01-01 PROCEDURE — 93308 TTE F-UP OR LMTD: CPT | Mod: 26 | Performed by: INTERNAL MEDICINE

## 2022-01-01 PROCEDURE — C9803 HOPD COVID-19 SPEC COLLECT: HCPCS

## 2022-01-01 PROCEDURE — 72125 CT NECK SPINE W/O DYE: CPT

## 2022-01-01 PROCEDURE — 96375 TX/PRO/DX INJ NEW DRUG ADDON: CPT

## 2022-01-01 PROCEDURE — 250N000012 HC RX MED GY IP 250 OP 636 PS 637: Performed by: EMERGENCY MEDICINE

## 2022-01-01 PROCEDURE — 250N000009 HC RX 250

## 2022-01-01 PROCEDURE — 999N000104 HC STATISTIC NO CHARGE

## 2022-01-01 PROCEDURE — 99231 SBSQ HOSP IP/OBS SF/LOW 25: CPT | Mod: GC | Performed by: PSYCHIATRY & NEUROLOGY

## 2022-01-01 PROCEDURE — 87086 URINE CULTURE/COLONY COUNT: CPT | Performed by: EMERGENCY MEDICINE

## 2022-01-01 PROCEDURE — 85384 FIBRINOGEN ACTIVITY: CPT | Performed by: INTERNAL MEDICINE

## 2022-01-01 PROCEDURE — 83615 LACTATE (LD) (LDH) ENZYME: CPT | Performed by: INTERNAL MEDICINE

## 2022-01-01 PROCEDURE — 76700 US EXAM ABDOM COMPLETE: CPT

## 2022-01-01 PROCEDURE — U0003 INFECTIOUS AGENT DETECTION BY NUCLEIC ACID (DNA OR RNA); SEVERE ACUTE RESPIRATORY SYNDROME CORONAVIRUS 2 (SARS-COV-2) (CORONAVIRUS DISEASE [COVID-19]), AMPLIFIED PROBE TECHNIQUE, MAKING USE OF HIGH THROUGHPUT TECHNOLOGIES AS DESCRIBED BY CMS-2020-01-R: HCPCS | Performed by: EMERGENCY MEDICINE

## 2022-01-01 PROCEDURE — 84450 TRANSFERASE (AST) (SGOT): CPT | Performed by: INTERNAL MEDICINE

## 2022-01-01 PROCEDURE — 94002 VENT MGMT INPAT INIT DAY: CPT

## 2022-01-01 PROCEDURE — 74177 CT ABD & PELVIS W/CONTRAST: CPT

## 2022-01-01 PROCEDURE — 99291 CRITICAL CARE FIRST HOUR: CPT | Mod: 25

## 2022-01-01 PROCEDURE — 36620 INSERTION CATHETER ARTERY: CPT

## 2022-01-01 PROCEDURE — 83605 ASSAY OF LACTIC ACID: CPT

## 2022-01-01 PROCEDURE — 93306 TTE W/DOPPLER COMPLETE: CPT

## 2022-01-01 PROCEDURE — 87077 CULTURE AEROBIC IDENTIFY: CPT | Performed by: EMERGENCY MEDICINE

## 2022-01-01 PROCEDURE — 36620 INSERTION CATHETER ARTERY: CPT | Mod: GC | Performed by: INTERNAL MEDICINE

## 2022-01-01 PROCEDURE — 36415 COLL VENOUS BLD VENIPUNCTURE: CPT | Performed by: INTERNAL MEDICINE

## 2022-01-01 PROCEDURE — 5A1945Z RESPIRATORY VENTILATION, 24-96 CONSECUTIVE HOURS: ICD-10-PCS | Performed by: EMERGENCY MEDICINE

## 2022-01-01 PROCEDURE — 93321 DOPPLER ECHO F-UP/LMTD STD: CPT | Mod: 26 | Performed by: INTERNAL MEDICINE

## 2022-01-01 PROCEDURE — 99291 CRITICAL CARE FIRST HOUR: CPT | Mod: 25 | Performed by: INTERNAL MEDICINE

## 2022-01-01 RX ORDER — AMOXICILLIN 250 MG
1 CAPSULE ORAL 2 TIMES DAILY PRN
Status: DISCONTINUED | OUTPATIENT
Start: 2022-01-01 | End: 2022-05-27 | Stop reason: HOSPADM

## 2022-01-01 RX ORDER — HEPARIN SODIUM 5000 [USP'U]/.5ML
5000 INJECTION, SOLUTION INTRAVENOUS; SUBCUTANEOUS EVERY 8 HOURS
Status: DISCONTINUED | OUTPATIENT
Start: 2022-01-01 | End: 2022-05-27 | Stop reason: HOSPADM

## 2022-01-01 RX ORDER — EPINEPHRINE IN 0.9 % SOD CHLOR 5 MG/250ML
.01-.3 PLASTIC BAG, INJECTION (ML) INTRAVENOUS CONTINUOUS
Status: DISCONTINUED | OUTPATIENT
Start: 2022-01-01 | End: 2022-05-27 | Stop reason: HOSPADM

## 2022-01-01 RX ORDER — CHLORHEXIDINE GLUCONATE ORAL RINSE 1.2 MG/ML
15 SOLUTION DENTAL EVERY 12 HOURS
Status: DISCONTINUED | OUTPATIENT
Start: 2022-01-01 | End: 2022-05-27 | Stop reason: HOSPADM

## 2022-01-01 RX ORDER — AMOXICILLIN 250 MG
2 CAPSULE ORAL 2 TIMES DAILY PRN
Status: DISCONTINUED | OUTPATIENT
Start: 2022-01-01 | End: 2022-05-27 | Stop reason: HOSPADM

## 2022-01-01 RX ORDER — NALOXONE HYDROCHLORIDE 0.4 MG/ML
0.2 INJECTION, SOLUTION INTRAMUSCULAR; INTRAVENOUS; SUBCUTANEOUS
Status: DISCONTINUED | OUTPATIENT
Start: 2022-01-01 | End: 2022-05-27 | Stop reason: HOSPADM

## 2022-01-01 RX ORDER — SODIUM CHLORIDE, SODIUM LACTATE, POTASSIUM CHLORIDE, CALCIUM CHLORIDE 600; 310; 30; 20 MG/100ML; MG/100ML; MG/100ML; MG/100ML
INJECTION, SOLUTION INTRAVENOUS CONTINUOUS
Status: DISCONTINUED | OUTPATIENT
Start: 2022-01-01 | End: 2022-05-27 | Stop reason: HOSPADM

## 2022-01-01 RX ORDER — GABAPENTIN 300 MG/1
900 CAPSULE ORAL 2 TIMES DAILY
COMMUNITY

## 2022-01-01 RX ORDER — PIPERACILLIN SODIUM, TAZOBACTAM SODIUM 4; .5 G/20ML; G/20ML
4.5 INJECTION, POWDER, LYOPHILIZED, FOR SOLUTION INTRAVENOUS ONCE
Status: COMPLETED | OUTPATIENT
Start: 2022-01-01 | End: 2022-01-01

## 2022-01-01 RX ORDER — METOPROLOL TARTRATE 1 MG/ML
5 INJECTION, SOLUTION INTRAVENOUS EVERY 4 HOURS PRN
Status: DISCONTINUED | OUTPATIENT
Start: 2022-01-01 | End: 2022-05-27 | Stop reason: HOSPADM

## 2022-01-01 RX ORDER — NALOXONE HYDROCHLORIDE 0.4 MG/ML
0.2 INJECTION, SOLUTION INTRAMUSCULAR; INTRAVENOUS; SUBCUTANEOUS
Status: DISCONTINUED | OUTPATIENT
Start: 2022-01-01 | End: 2022-01-01

## 2022-01-01 RX ORDER — ACETAMINOPHEN 650 MG/1
650 SUPPOSITORY RECTAL EVERY 4 HOURS PRN
Status: DISCONTINUED | OUTPATIENT
Start: 2022-01-01 | End: 2022-05-27 | Stop reason: HOSPADM

## 2022-01-01 RX ORDER — POTASSIUM CHLORIDE 29.8 MG/ML
20 INJECTION INTRAVENOUS
Status: COMPLETED | OUTPATIENT
Start: 2022-01-01 | End: 2022-01-01

## 2022-01-01 RX ORDER — POTASSIUM CHLORIDE 7.45 MG/ML
10 INJECTION INTRAVENOUS
Status: DISCONTINUED | OUTPATIENT
Start: 2022-01-01 | End: 2022-01-01

## 2022-01-01 RX ORDER — LORAZEPAM 2 MG/ML
1 INJECTION INTRAMUSCULAR
Status: DISCONTINUED | OUTPATIENT
Start: 2022-01-01 | End: 2022-05-27 | Stop reason: HOSPADM

## 2022-01-01 RX ORDER — DILTIAZEM HYDROCHLORIDE 120 MG/1
120 CAPSULE, EXTENDED RELEASE ORAL 2 TIMES DAILY
COMMUNITY
Start: 2021-01-01

## 2022-01-01 RX ORDER — GABAPENTIN 300 MG/1
1200 CAPSULE ORAL AT BEDTIME
COMMUNITY

## 2022-01-01 RX ORDER — IOPAMIDOL 755 MG/ML
98 INJECTION, SOLUTION INTRAVASCULAR ONCE
Status: COMPLETED | OUTPATIENT
Start: 2022-01-01 | End: 2022-01-01

## 2022-01-01 RX ORDER — ATORVASTATIN CALCIUM 20 MG/1
20 TABLET, FILM COATED ORAL DAILY
COMMUNITY
Start: 2022-01-01

## 2022-01-01 RX ORDER — VECURONIUM BROMIDE 1 MG/ML
0.1 INJECTION, POWDER, LYOPHILIZED, FOR SOLUTION INTRAVENOUS
Status: DISCONTINUED | OUTPATIENT
Start: 2022-01-01 | End: 2022-05-27 | Stop reason: HOSPADM

## 2022-01-01 RX ORDER — ACETAMINOPHEN 325 MG/1
650 TABLET ORAL EVERY 4 HOURS PRN
Status: DISCONTINUED | OUTPATIENT
Start: 2022-01-01 | End: 2022-05-27 | Stop reason: HOSPADM

## 2022-01-01 RX ORDER — NICOTINE POLACRILEX 4 MG
15-30 LOZENGE BUCCAL
Status: DISCONTINUED | OUTPATIENT
Start: 2022-01-01 | End: 2022-01-01

## 2022-01-01 RX ORDER — NICOTINE POLACRILEX 4 MG
15-30 LOZENGE BUCCAL
Status: DISCONTINUED | OUTPATIENT
Start: 2022-01-01 | End: 2022-05-27 | Stop reason: HOSPADM

## 2022-01-01 RX ORDER — FENTANYL CITRATE 50 UG/ML
50 INJECTION, SOLUTION INTRAMUSCULAR; INTRAVENOUS EVERY 30 MIN PRN
Status: DISCONTINUED | OUTPATIENT
Start: 2022-01-01 | End: 2022-05-27 | Stop reason: HOSPADM

## 2022-01-01 RX ORDER — LISINOPRIL 20 MG/1
20 TABLET ORAL DAILY
COMMUNITY
Start: 2022-01-01

## 2022-01-01 RX ORDER — ATROPINE SULFATE 10 MG/ML
2 SOLUTION/ DROPS OPHTHALMIC EVERY 4 HOURS PRN
Status: DISCONTINUED | OUTPATIENT
Start: 2022-01-01 | End: 2022-05-27 | Stop reason: HOSPADM

## 2022-01-01 RX ORDER — BENZTROPINE MESYLATE 1 MG/1
1 TABLET ORAL 2 TIMES DAILY
COMMUNITY
Start: 2022-01-01

## 2022-01-01 RX ORDER — NALOXONE HYDROCHLORIDE 0.4 MG/ML
0.1 INJECTION, SOLUTION INTRAMUSCULAR; INTRAVENOUS; SUBCUTANEOUS
Status: DISCONTINUED | OUTPATIENT
Start: 2022-01-01 | End: 2022-05-27 | Stop reason: HOSPADM

## 2022-01-01 RX ORDER — HEPARIN SODIUM 1000 [USP'U]/ML
32000 INJECTION, SOLUTION INTRAVENOUS; SUBCUTANEOUS ONCE
Status: COMPLETED | OUTPATIENT
Start: 2022-01-01 | End: 2022-01-01

## 2022-01-01 RX ORDER — EPINEPHRINE IN 0.9 % SOD CHLOR 5 MG/250ML
.01-.3 PLASTIC BAG, INJECTION (ML) INTRAVENOUS CONTINUOUS
Status: DISCONTINUED | OUTPATIENT
Start: 2022-01-01 | End: 2022-01-01

## 2022-01-01 RX ORDER — PROPOFOL 10 MG/ML
5-75 INJECTION, EMULSION INTRAVENOUS CONTINUOUS
Status: DISCONTINUED | OUTPATIENT
Start: 2022-01-01 | End: 2022-05-27 | Stop reason: HOSPADM

## 2022-01-01 RX ORDER — FENTANYL CITRATE 50 UG/ML
50-100 INJECTION, SOLUTION INTRAMUSCULAR; INTRAVENOUS
Status: DISCONTINUED | OUTPATIENT
Start: 2022-01-01 | End: 2022-05-27 | Stop reason: HOSPADM

## 2022-01-01 RX ORDER — DEXTROSE MONOHYDRATE 25 G/50ML
25-50 INJECTION, SOLUTION INTRAVENOUS
Status: DISCONTINUED | OUTPATIENT
Start: 2022-01-01 | End: 2022-05-27 | Stop reason: HOSPADM

## 2022-01-01 RX ORDER — DEXTROSE MONOHYDRATE 25 G/50ML
25-50 INJECTION, SOLUTION INTRAVENOUS
Status: DISCONTINUED | OUTPATIENT
Start: 2022-01-01 | End: 2022-01-01

## 2022-01-01 RX ORDER — NOREPINEPHRINE BITARTRATE 0.02 MG/ML
.01-.6 INJECTION, SOLUTION INTRAVENOUS CONTINUOUS
Status: DISCONTINUED | OUTPATIENT
Start: 2022-01-01 | End: 2022-05-27 | Stop reason: HOSPADM

## 2022-01-01 RX ORDER — PIPERACILLIN SODIUM, TAZOBACTAM SODIUM 4; .5 G/20ML; G/20ML
4.5 INJECTION, POWDER, LYOPHILIZED, FOR SOLUTION INTRAVENOUS EVERY 6 HOURS
Status: DISCONTINUED | OUTPATIENT
Start: 2022-01-01 | End: 2022-05-27 | Stop reason: HOSPADM

## 2022-01-01 RX ORDER — HEPARIN SODIUM 1000 [USP'U]/ML
32000 INJECTION, SOLUTION INTRAVENOUS; SUBCUTANEOUS ONCE
Status: DISCONTINUED | OUTPATIENT
Start: 2022-01-01 | End: 2022-01-01

## 2022-01-01 RX ORDER — CARBAMAZEPINE 300 MG/1
300 CAPSULE, EXTENDED RELEASE ORAL 2 TIMES DAILY
COMMUNITY
Start: 2020-07-17

## 2022-01-01 RX ORDER — NALOXONE HYDROCHLORIDE 0.4 MG/ML
0.4 INJECTION, SOLUTION INTRAMUSCULAR; INTRAVENOUS; SUBCUTANEOUS
Status: DISCONTINUED | OUTPATIENT
Start: 2022-01-01 | End: 2022-01-01

## 2022-01-01 RX ORDER — VECURONIUM BROMIDE 1 MG/ML
0.05 INJECTION, POWDER, LYOPHILIZED, FOR SOLUTION INTRAVENOUS EVERY 30 MIN PRN
Status: DISCONTINUED | OUTPATIENT
Start: 2022-01-01 | End: 2022-05-27 | Stop reason: HOSPADM

## 2022-01-01 RX ORDER — LORAZEPAM 2 MG/ML
2 INJECTION INTRAMUSCULAR
Status: DISCONTINUED | OUTPATIENT
Start: 2022-01-01 | End: 2022-05-27 | Stop reason: HOSPADM

## 2022-01-01 RX ORDER — HYDROMORPHONE HYDROCHLORIDE 1 MG/ML
.3-.5 INJECTION, SOLUTION INTRAMUSCULAR; INTRAVENOUS; SUBCUTANEOUS
Status: DISCONTINUED | OUTPATIENT
Start: 2022-01-01 | End: 2022-05-27 | Stop reason: HOSPADM

## 2022-01-01 RX ORDER — DEXTROSE MONOHYDRATE 100 MG/ML
INJECTION, SOLUTION INTRAVENOUS CONTINUOUS PRN
Status: DISCONTINUED | OUTPATIENT
Start: 2022-01-01 | End: 2022-05-27 | Stop reason: HOSPADM

## 2022-01-01 RX ORDER — ARIPIPRAZOLE 15 MG/1
15 TABLET ORAL DAILY
COMMUNITY
Start: 2022-01-01

## 2022-01-01 RX ORDER — PHENYLEPHRINE HCL IN 0.9% NACL 50MG/250ML
.1-6 PLASTIC BAG, INJECTION (ML) INTRAVENOUS CONTINUOUS
Status: DISCONTINUED | OUTPATIENT
Start: 2022-01-01 | End: 2022-05-27 | Stop reason: HOSPADM

## 2022-01-01 RX ORDER — AMITRIPTYLINE HYDROCHLORIDE 10 MG/1
10 TABLET ORAL DAILY
COMMUNITY
Start: 2022-01-01

## 2022-01-01 RX ORDER — MAGNESIUM SULFATE HEPTAHYDRATE 40 MG/ML
4 INJECTION, SOLUTION INTRAVENOUS ONCE
Status: COMPLETED | OUTPATIENT
Start: 2022-01-01 | End: 2022-01-01

## 2022-01-01 RX ORDER — MEPERIDINE HYDROCHLORIDE 25 MG/ML
25-50 INJECTION INTRAMUSCULAR; INTRAVENOUS; SUBCUTANEOUS
Status: ACTIVE | OUTPATIENT
Start: 2022-01-01 | End: 2022-01-01

## 2022-01-01 RX ADMIN — SODIUM CHLORIDE, POTASSIUM CHLORIDE, SODIUM LACTATE AND CALCIUM CHLORIDE 1000 ML: 600; 310; 30; 20 INJECTION, SOLUTION INTRAVENOUS at 10:57

## 2022-01-01 RX ADMIN — MINERAL OIL AND PETROLATUM 3.5 G: 150; 830 OINTMENT OPHTHALMIC at 16:05

## 2022-01-01 RX ADMIN — HEPARIN SODIUM 5000 UNITS: 5000 INJECTION, SOLUTION INTRAVENOUS; SUBCUTANEOUS at 18:37

## 2022-01-01 RX ADMIN — PROPOFOL 35 MCG/KG/MIN: 10 INJECTION, EMULSION INTRAVENOUS at 10:40

## 2022-01-01 RX ADMIN — POTASSIUM CHLORIDE 20 MEQ: 29.8 INJECTION, SOLUTION INTRAVENOUS at 06:22

## 2022-01-01 RX ADMIN — Medication 0.3 MCG/KG/MIN: at 19:33

## 2022-01-01 RX ADMIN — SODIUM CHLORIDE, POTASSIUM CHLORIDE, SODIUM LACTATE AND CALCIUM CHLORIDE: 600; 310; 30; 20 INJECTION, SOLUTION INTRAVENOUS at 19:21

## 2022-01-01 RX ADMIN — SODIUM CHLORIDE, POTASSIUM CHLORIDE, SODIUM LACTATE AND CALCIUM CHLORIDE 1000 ML: 600; 310; 30; 20 INJECTION, SOLUTION INTRAVENOUS at 07:17

## 2022-01-01 RX ADMIN — SODIUM CHLORIDE, POTASSIUM CHLORIDE, SODIUM LACTATE AND CALCIUM CHLORIDE 1000 ML: 600; 310; 30; 20 INJECTION, SOLUTION INTRAVENOUS at 03:30

## 2022-01-01 RX ADMIN — FENTANYL CITRATE 50 MCG: 50 INJECTION, SOLUTION INTRAMUSCULAR; INTRAVENOUS at 13:49

## 2022-01-01 RX ADMIN — POTASSIUM CHLORIDE 20 MEQ: 29.8 INJECTION, SOLUTION INTRAVENOUS at 00:33

## 2022-01-01 RX ADMIN — HEPARIN SODIUM 5000 UNITS: 5000 INJECTION, SOLUTION INTRAVENOUS; SUBCUTANEOUS at 00:33

## 2022-01-01 RX ADMIN — PROPOFOL 35 MCG/KG/MIN: 10 INJECTION, EMULSION INTRAVENOUS at 06:45

## 2022-01-01 RX ADMIN — SODIUM PHOSPHATE, MONOBASIC, MONOHYDRATE 15 MMOL: 276; 142 INJECTION, SOLUTION INTRAVENOUS at 11:45

## 2022-01-01 RX ADMIN — Medication 0.26 MCG/KG/MIN: at 19:32

## 2022-01-01 RX ADMIN — SODIUM CHLORIDE, POTASSIUM CHLORIDE, SODIUM LACTATE AND CALCIUM CHLORIDE: 600; 310; 30; 20 INJECTION, SOLUTION INTRAVENOUS at 15:01

## 2022-01-01 RX ADMIN — VANCOMYCIN HYDROCHLORIDE 2000 MG: 5 INJECTION, POWDER, LYOPHILIZED, FOR SOLUTION INTRAVENOUS at 20:57

## 2022-01-01 RX ADMIN — LORAZEPAM 1 MG: 2 INJECTION INTRAMUSCULAR; INTRAVENOUS at 21:21

## 2022-01-01 RX ADMIN — PIPERACILLIN SODIUM AND TAZOBACTAM SODIUM 4.5 G: 4; .5 INJECTION, POWDER, LYOPHILIZED, FOR SOLUTION INTRAVENOUS at 08:31

## 2022-01-01 RX ADMIN — PROPOFOL 35 MCG/KG/MIN: 10 INJECTION, EMULSION INTRAVENOUS at 21:57

## 2022-01-01 RX ADMIN — CHLORHEXIDINE GLUCONATE 15 ML: 1.2 SOLUTION ORAL at 00:57

## 2022-01-01 RX ADMIN — Medication 0.25 MCG/KG/MIN: at 16:36

## 2022-01-01 RX ADMIN — CHLORHEXIDINE GLUCONATE 15 ML: 1.2 SOLUTION ORAL at 07:41

## 2022-01-01 RX ADMIN — MAGNESIUM SULFATE HEPTAHYDRATE 4 G: 40 INJECTION, SOLUTION INTRAVENOUS at 00:18

## 2022-01-01 RX ADMIN — IOPAMIDOL 98 ML: 755 INJECTION, SOLUTION INTRAVENOUS at 18:39

## 2022-01-01 RX ADMIN — PIPERACILLIN SODIUM AND TAZOBACTAM SODIUM 4.5 G: 4; .5 INJECTION, POWDER, LYOPHILIZED, FOR SOLUTION INTRAVENOUS at 15:59

## 2022-01-01 RX ADMIN — POTASSIUM CHLORIDE 20 MEQ: 29.8 INJECTION, SOLUTION INTRAVENOUS at 05:51

## 2022-01-01 RX ADMIN — MINERAL OIL AND PETROLATUM 3.5 G: 150; 830 OINTMENT OPHTHALMIC at 07:41

## 2022-01-01 RX ADMIN — POTASSIUM CHLORIDE 20 MEQ: 29.8 INJECTION, SOLUTION INTRAVENOUS at 14:19

## 2022-01-01 RX ADMIN — SODIUM PHOSPHATE, MONOBASIC, MONOHYDRATE 15 MMOL: 276; 142 INJECTION, SOLUTION INTRAVENOUS at 06:20

## 2022-01-01 RX ADMIN — PIPERACILLIN SODIUM AND TAZOBACTAM SODIUM 4.5 G: 4; .5 INJECTION, POWDER, LYOPHILIZED, FOR SOLUTION INTRAVENOUS at 03:48

## 2022-01-01 RX ADMIN — PROPOFOL 35 MCG/KG/MIN: 10 INJECTION, EMULSION INTRAVENOUS at 12:56

## 2022-01-01 RX ADMIN — PIPERACILLIN SODIUM AND TAZOBACTAM SODIUM 4.5 G: 4; .5 INJECTION, POWDER, LYOPHILIZED, FOR SOLUTION INTRAVENOUS at 19:04

## 2022-01-01 RX ADMIN — SODIUM BICARBONATE 50 MEQ: 84 INJECTION INTRAVENOUS at 18:00

## 2022-01-01 RX ADMIN — PROPOFOL 15 MCG/KG/MIN: 10 INJECTION, EMULSION INTRAVENOUS at 16:53

## 2022-01-01 RX ADMIN — SODIUM CHLORIDE, POTASSIUM CHLORIDE, SODIUM LACTATE AND CALCIUM CHLORIDE 1000 ML: 600; 310; 30; 20 INJECTION, SOLUTION INTRAVENOUS at 05:23

## 2022-01-01 RX ADMIN — PROPOFOL 10 MCG/KG/MIN: 10 INJECTION, EMULSION INTRAVENOUS at 09:02

## 2022-01-01 RX ADMIN — PROPOFOL 35 MCG/KG/MIN: 10 INJECTION, EMULSION INTRAVENOUS at 01:40

## 2022-01-01 RX ADMIN — SODIUM CHLORIDE 94 ML: 900 INJECTION INTRAVENOUS at 18:39

## 2022-01-01 RX ADMIN — CHLORHEXIDINE GLUCONATE 15 ML: 1.2 SOLUTION ORAL at 20:50

## 2022-01-01 RX ADMIN — MINERAL OIL AND PETROLATUM 3.5 G: 150; 830 OINTMENT OPHTHALMIC at 15:57

## 2022-01-01 RX ADMIN — Medication 0.03 MCG/KG/MIN: at 03:51

## 2022-01-01 RX ADMIN — Medication 3 MCG/KG/MIN: at 15:54

## 2022-01-01 RX ADMIN — Medication 0.3 MCG/KG/MIN: at 17:57

## 2022-01-01 RX ADMIN — MINERAL OIL AND PETROLATUM 3.5 G: 150; 830 OINTMENT OPHTHALMIC at 00:33

## 2022-01-01 RX ADMIN — SODIUM BICARBONATE: 84 INJECTION, SOLUTION INTRAVENOUS at 01:42

## 2022-01-01 RX ADMIN — HEPARIN SODIUM 5000 UNITS: 5000 INJECTION, SOLUTION INTRAVENOUS; SUBCUTANEOUS at 09:21

## 2022-01-01 RX ADMIN — POTASSIUM CHLORIDE 20 MEQ: 29.8 INJECTION, SOLUTION INTRAVENOUS at 07:24

## 2022-01-01 RX ADMIN — PIPERACILLIN SODIUM AND TAZOBACTAM SODIUM 4.5 G: 4; .5 INJECTION, POWDER, LYOPHILIZED, FOR SOLUTION INTRAVENOUS at 14:59

## 2022-01-01 RX ADMIN — INSULIN ASPART 1 UNITS: 100 INJECTION, SOLUTION INTRAVENOUS; SUBCUTANEOUS at 19:34

## 2022-01-01 RX ADMIN — HEPARIN SODIUM 32000 UNITS: 1000 INJECTION INTRAVENOUS; SUBCUTANEOUS at 21:16

## 2022-01-01 RX ADMIN — Medication 0.5 MCG/KG/MIN: at 12:26

## 2022-01-01 RX ADMIN — HEPARIN SODIUM 5000 UNITS: 5000 INJECTION, SOLUTION INTRAVENOUS; SUBCUTANEOUS at 16:53

## 2022-01-01 RX ADMIN — CHLORHEXIDINE GLUCONATE 15 ML: 1.2 SOLUTION ORAL at 19:34

## 2022-01-01 RX ADMIN — SODIUM CHLORIDE 4.5 UNITS/HR: 9 INJECTION, SOLUTION INTRAVENOUS at 04:39

## 2022-01-01 RX ADMIN — HEPARIN SODIUM 5000 UNITS: 5000 INJECTION, SOLUTION INTRAVENOUS; SUBCUTANEOUS at 08:30

## 2022-01-01 RX ADMIN — POTASSIUM CHLORIDE 20 MEQ: 29.8 INJECTION, SOLUTION INTRAVENOUS at 06:58

## 2022-01-01 RX ADMIN — PIPERACILLIN SODIUM AND TAZOBACTAM SODIUM 4.5 G: 4; .5 INJECTION, POWDER, LYOPHILIZED, FOR SOLUTION INTRAVENOUS at 02:20

## 2022-01-01 RX ADMIN — SODIUM BICARBONATE 50 MEQ: 84 INJECTION INTRAVENOUS at 17:50

## 2022-01-01 RX ADMIN — PHYTONADIONE 5 MG: 10 INJECTION, EMULSION INTRAMUSCULAR; INTRAVENOUS; SUBCUTANEOUS at 10:53

## 2022-01-01 RX ADMIN — Medication 0.3 MCG/KG/MIN: at 20:44

## 2022-01-01 RX ADMIN — SODIUM PHOSPHATE, MONOBASIC, MONOHYDRATE 15 MMOL: 276; 142 INJECTION, SOLUTION INTRAVENOUS at 07:41

## 2022-01-01 RX ADMIN — HEPARIN SODIUM 5000 UNITS: 5000 INJECTION, SOLUTION INTRAVENOUS; SUBCUTANEOUS at 00:57

## 2022-01-01 RX ADMIN — FENTANYL CITRATE 50 MCG: 50 INJECTION, SOLUTION INTRAMUSCULAR; INTRAVENOUS at 08:28

## 2022-01-01 RX ADMIN — Medication 1 MCG/KG/MIN: at 18:39

## 2022-01-01 RX ADMIN — PROPOFOL 35 MCG/KG/MIN: 10 INJECTION, EMULSION INTRAVENOUS at 17:17

## 2022-01-01 RX ADMIN — POTASSIUM CHLORIDE 20 MEQ: 29.8 INJECTION, SOLUTION INTRAVENOUS at 01:41

## 2022-01-01 RX ADMIN — Medication 0.25 MCG/KG/MIN: at 13:52

## 2022-01-01 RX ADMIN — ACETAMINOPHEN 650 MG: 650 SUPPOSITORY RECTAL at 04:26

## 2022-01-01 RX ADMIN — SODIUM CHLORIDE 5 UNITS: 9 INJECTION, SOLUTION INTRAVENOUS at 18:16

## 2022-01-01 RX ADMIN — PIPERACILLIN SODIUM AND TAZOBACTAM SODIUM 4.5 G: 4; .5 INJECTION, POWDER, LYOPHILIZED, FOR SOLUTION INTRAVENOUS at 09:02

## 2022-01-01 RX ADMIN — INSULIN ASPART 1 UNITS: 100 INJECTION, SOLUTION INTRAVENOUS; SUBCUTANEOUS at 12:32

## 2022-01-01 RX ADMIN — SODIUM BICARBONATE: 84 INJECTION, SOLUTION INTRAVENOUS at 18:59

## 2022-01-01 RX ADMIN — MINERAL OIL AND PETROLATUM 3.5 G: 150; 830 OINTMENT OPHTHALMIC at 09:02

## 2022-01-01 RX ADMIN — PIPERACILLIN SODIUM AND TAZOBACTAM SODIUM 4.5 G: 4; .5 INJECTION, POWDER, LYOPHILIZED, FOR SOLUTION INTRAVENOUS at 20:50

## 2022-01-01 RX ADMIN — POTASSIUM CHLORIDE 20 MEQ: 29.8 INJECTION, SOLUTION INTRAVENOUS at 04:43

## 2022-01-01 RX ADMIN — SODIUM BICARBONATE 50 MEQ: 84 INJECTION INTRAVENOUS at 18:16

## 2022-01-01 RX ADMIN — SODIUM CHLORIDE 3 UNITS/HR: 9 INJECTION, SOLUTION INTRAVENOUS at 10:55

## 2022-01-01 RX ADMIN — CHLORHEXIDINE GLUCONATE 15 ML: 1.2 SOLUTION ORAL at 09:02

## 2022-01-01 RX ADMIN — HYDROMORPHONE HYDROCHLORIDE 0.5 MG: 1 INJECTION, SOLUTION INTRAMUSCULAR; INTRAVENOUS; SUBCUTANEOUS at 21:19

## 2022-01-01 RX ADMIN — SODIUM BICARBONATE 50 MEQ: 84 INJECTION INTRAVENOUS at 17:43

## 2022-01-01 RX ADMIN — SODIUM CHLORIDE 2 UNITS/HR: 9 INJECTION, SOLUTION INTRAVENOUS at 00:38

## 2022-01-01 RX ADMIN — Medication 1 MCG/KG/MIN: at 19:35

## 2022-01-01 RX ADMIN — POTASSIUM CHLORIDE 20 MEQ: 29.8 INJECTION, SOLUTION INTRAVENOUS at 13:12

## 2022-01-01 RX ADMIN — SODIUM CHLORIDE, POTASSIUM CHLORIDE, SODIUM LACTATE AND CALCIUM CHLORIDE: 600; 310; 30; 20 INJECTION, SOLUTION INTRAVENOUS at 12:54

## 2022-01-01 RX ADMIN — Medication 0.07 MCG/KG/MIN: at 08:57

## 2022-01-01 ASSESSMENT — ACTIVITIES OF DAILY LIVING (ADL)
ADLS_ACUITY_SCORE: 43
ADLS_ACUITY_SCORE: 43
ADLS_ACUITY_SCORE: 35
ADLS_ACUITY_SCORE: 43
ADLS_ACUITY_SCORE: 35
ADLS_ACUITY_SCORE: 43

## 2022-03-31 NOTE — TELEPHONE ENCOUNTER
FUTURE VISIT INFORMATION      FUTURE VISIT INFORMATION:    Date: 5/3/2022    Time: 1020am     [place] [facility] [place]:    Referring provider:  Dr. Lujan     Referring providers clinic:  [facility]     Reason for visit/diagnosis  Trigeminal Neuralgia     RECORDS REQUESTED FROM:       Clinic name Comments Records Status Imaging Status   [facility]   Requested  Requested by mail          INternal Dr. Martinez-7/18/2014 Epic No Images                        4/1/2022-Request for images and records faxed to [facility] @ 726am    4/6/22 MV 1.14pm  Imaging disk received from [facility] via mail and sent to  for processing.    4/27/2022-[facility] Records scanned to [facility]MR @ 529am

## 2022-04-28 NOTE — TELEPHONE ENCOUNTER
Lisa returned call.  Lsia thought appointment by Telephone on Monday with Vaishnavi Alarcon NP part of facial pain team would work out great.  Appointment set for 5/2/22 telephone @ 1pm for new Trigeminal neuralgia appointment  Note sent to scheduling, patient has my name and number if needed.

## 2022-04-28 NOTE — TELEPHONE ENCOUNTER
Outbound call to patient, leaving message to please call JOAN CORADO for possible appointment change to Monday 5/2/22.    Please return call to

## 2022-05-02 NOTE — LETTER
"2022       RE: Lias Marshall  [address] [address]     Dear Colleague,    Thank you for referring your patient, Lisa Marshall, to the [facility] NEUROSURGERY [facility]INIC [place] at [facility]. Please see a copy of my visit note below.    Lisa is a 51 year old who is being evaluated via a billable telephone visit.      What phone number would you like to be contacted at? [phone]  How would you like to obtain your AVS? Mail a copy  Phone call duration: 45 minutes    [facility]  Department of Neurosurgery      Name: Lisa Marshall  MRN: [id number]  Age: 51 year old  : 1970  Referring provider: Lisa Lujan  2022      Chief Complaint:   Left sided facial pain    History of Present Illness:  Lisa is a 51-year-old female with a history of hypertension, hyperlipidemia, presenting today with long-term history of left-sided facial pain.  She was evaluated by Dr. Rob in .    Pain initially started in 4159-4081.  She did have a sinus surgery done in .  Patient reports pain on the entire LEFT side of face in V1 through V3 distribution. Pain never cross from one side to the other side of your face. Pain never travel outside of face (i.e. into neck, behind ear, or to the top of head).  Denies autonomic symptoms like facial flushing, eyelid drooping or swelling, eye redness, nasal stuffiness, sweating, and ear fullness.  She does report tearing from bilateral eyes.    Pain is described as throbbing, achy pain like \"getting her tooth pulled without any anesthesia\" and is triggered by cold or hot food or drinks. It is present / with different severity.  Today she rates the pain at 8 out of 10.  She has a history of migraines during her teen years, but no migraines currently.    Previously seen by multiple providers and was recommended lamotrigine, Flexeril, baclofen, opioids.  Patient " currently follows up with Dr. Cordero at [facility] neurological clinic.  She currently takes gabapentin [phone] and Tegretol 300-300.  No major side effects from these medications.    Social History:  She is single, no kids.  She is not working currently.  Denies smoking and recreational drug use.  Social use of alcohol.    Imaging:  MRI brain from 11//2020 is available in PACS.    OTHER IMPORTANT MEDICAL INFORMATION:  Have you been diagnosed with multiple sclerosis?   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 Lujan at [facility]       Past Surgical History:   Procedure Laterality Date     HERNIA REPAIR      ventral hernia repair 2012     SURGICAL HISTORY OF -   1/1990    wisdom teeth     THORACOSCOPIC WEDGE RESECTION LUNG  4/3/2012    Procedure:THORACOSCOPIC WEDGE RESECTION LUNG; RIGHT VIDEO ASSISTED THORACOSCOPY, WEDGE RESECTION; Surgeon:XAVIER GAONA; Location: OR     [facility] NONSPECIFIC PROCEDURE  12/28/06    sinus surgery       Family History   Problem Relation Age of Onset     Hypertension Father      Allergies Father      Hypertension Paternal Grandfather      Lipids Mother      Lipids Maternal Grandfather      Allergies No family hx of      Anesthesia Reaction No family hx of         self     Gynecology No family hx of         self     Neurologic Disorder No family hx of      Lipids Maternal Grandfather      Heart Disease  Maternal Grandmother          of heart attack 1972     Asthma No family hx of         self     Allergies Sister      Anesthesia Reaction Sister      Depression Sister        Social History     Tobacco Use     Smoking status: Never Smoker     Smokeless tobacco: Never Used   Substance Use Topics     Alcohol use: Yes     Comment: social   '    Do you have a pacemaker or a history of heart conditions?   No    Do you take any prescription blood thinners? (examples: Coumadin/Warfarin, Eliquis, Plavix, Lovenox, Pradaxa, Xarelto)   No    Do you take aspirin (ASA, either 81mg or 325mg)?   She currently takes a baby aspirin.    Physical Exam:  N/A (telephone visit)    Assessment:  Atypical left facial pain    Plan:  No medication changes today.  We will review the MRI and will contact the patient with further recommendations.  Patient is a good candidate for multidisciplinary facial pain clinic.    Addendum on 2022: Discussed with and images reviewed with Dr. Patel. There is no compression of cranial nerves to explain patient's current symptoms. We will schedule a visit with our multidisciplinary facial pain team.       Vaishnavi Alarcon CNP  Department of Neurosurgery    I spent 45 minutes on patient care activities related to this encounter on the date of service, including time spent reviewing the chart, obtaining history and examination and in counseling the patient, and in documentation in the electronic medical record.

## 2022-05-02 NOTE — PROGRESS NOTES
"Lisa is a 51 year old who is being evaluated via a billable telephone visit.      What phone number would you like to be contacted at? [phone]  How would you like to obtain your AVS? Mail a copy  Phone call duration: 45 minutes    [facility] Facial Pain [facility]  Department of Neurosurgery      Name: Lisa Marshall  MRN: [id number]  Age: 51 year old  : 1970  Referring provider: Lisa Lujan  2022      Chief Complaint:   Left sided facial pain    History of Present Illness:  Lisa is a 51-year-old female with a history of hypertension, hyperlipidemia, presenting today with long-term history of left-sided facial pain.  She was evaluated by Dr. Rob in .    Pain initially started in [id number].  She did have a sinus surgery done in .  Patient reports pain on the entire LEFT side of face in V1 through V3 distribution. Pain never cross from one side to the other side of your face. Pain never travel outside of face (i.e. into neck, behind ear, or to the top of head).  Denies autonomic symptoms like facial flushing, eyelid drooping or swelling, eye redness, nasal stuffiness, sweating, and ear fullness.  She does report tearing from bilateral eyes.    Pain is described as throbbing, achy pain like \"getting her tooth pulled without any anesthesia\" and is triggered by cold or hot food or drinks. It is present 24/ with different severity.  Today she rates the pain at 8 out of 10.  She has a history of migraines during her teen years, but no migraines currently.    Previously seen by multiple providers and was recommended lamotrigine, Flexeril, baclofen, opioids.  Patient currently follows up with Dr. Cordero at [facility].  She currently takes gabapentin [phone] and Tegretol 300-300.  No major side effects from these medications.    Social History:  She is single, no kids.  She is not working currently.  Denies smoking and recreational drug use.  Social use of " alcohol.    Imaging:  MRI brain from 2020 is available in PACS.    OTHER IMPORTANT MEDICAL INFORMATION:  Have you been diagnosed with multiple sclerosis?   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 Lujan at [facility]       Past Surgical History:   Procedure Laterality Date     HERNIA REPAIR      ventral hernia repair      SURGICAL HISTORY OF -   1990    wisdom teeth     THORACOSCOPIC WEDGE RESECTION LUNG  4/3/2012    Procedure:THORACOSCOPIC WEDGE RESECTION LUNG; RIGHT VIDEO ASSISTED THORACOSCOPY, WEDGE RESECTION; Surgeon:XAVIER GAONA; Location: OR     [facility] NONSPECIFIC PROCEDURE  06    sinus surgery       Family History   Problem Relation Age of Onset     Hypertension Father      Allergies Father      Hypertension Paternal Grandfather      Lipids Mother      Lipids Maternal Grandfather      Allergies No family hx of      Anesthesia Reaction No family hx of         self     Gynecology No family hx of         self     Neurologic Disorder No family hx of      Lipids Maternal Grandfather      Heart Disease Maternal Grandmother          of heart attack 1972     Asthma No family hx of         self     Allergies Sister      Anesthesia Reaction Sister      Depression Sister        Social History     Tobacco Use     Smoking status: Never Smoker     Smokeless tobacco: Never Used   Substance Use Topics     Alcohol use:  Yes     Comment: social   '    Do you have a pacemaker or a history of heart conditions?   No    Do you take any prescription blood thinners? (examples: Coumadin/Warfarin, Eliquis, Plavix, Lovenox, Pradaxa, Xarelto)   No    Do you take aspirin (ASA, either 81mg or 325mg)?   She currently takes a baby aspirin.    Physical Exam:  N/A (telephone visit)    Assessment:  Atypical left facial pain    Plan:  No medication changes today.  We will review the MRI and will contact the patient with further recommendations.  Patient is a good candidate for multidisciplinary facial pain clinic.    Addendum on 5/5/2022: Discussed with and images reviewed with Dr. Patel. There is no compression of cranial nerves to explain patient's current symptoms. We will schedule a visit with our multidisciplinary facial pain team.       Vaishnavi Alarcon CNP  Department of Neurosurgery    I spent 45 minutes on patient care activities related to this encounter on the date of service, including time spent reviewing the chart, obtaining history and examination and in counseling the patient, and in documentation in the electronic medical record.

## 2022-05-02 NOTE — NURSING NOTE
Chief Complaint   Patient presents with     Telephone     Trigeminal Neuralgia         Per preauth team pts insurance is requiring at least 3 PT/Chiro sessions to be completed before procedure. Looking in her chart there does not seem to be 3 completed. Called pt to discuss with pt but no answer, left voice message for her to call clinic back to possible reschedule procedure until she is able to complete 3 sessions.

## 2022-05-20 NOTE — TELEPHONE ENCOUNTER
LVM after being unable to reach patient on multiple attempts. Left direct line for patient to call back and be scheduled in the snf.

## 2022-05-24 PROBLEM — I46.9 CARDIAC ARREST (H): Status: ACTIVE | Noted: 2022-01-01

## 2022-05-24 PROBLEM — G93.1 ANOXIC BRAIN DAMAGE (H): Status: ACTIVE | Noted: 2022-01-01

## 2022-05-24 NOTE — ED PROVIDER NOTES
History   Chief Complaint:  Cardiac arrest     The history is limited by the condition of the patient.    Supplemented by EMS and care everywhere    Lisa Marshall is a 51 year old female with history of Type II diabetes, hypertension, TYRONE and schizophrenia who presents via EMS for cardiac arrest. Per EMS the patient was found face down at her assisted living facility by staff. She was not blue when she was found but they are unsure how long she was out. CPR was given for 10 minutes she was found to be asystole PEA. She was given 3 round of epi and 1 round of bicarb by EMS . She had a pulse after the 3rd round of epi. They noted her blood pressure to be in the 70's. Has iGel airway in place on arrival.    Review of Systems   Unable to perform ROS: Patient unresponsive       Allergies:  Topiramate    Medications:  Albuterol  Elavil  Abilify  Lipitor  Cogentin  Symbicort  Carbarol  Celexa  Dilt-XR  Neurontin  Zestril  Singulair  Trileptal  Simvastatin  Spironolactone  Sumatriptan succinate  Verapamil    Past Medical History:     Hypertension  Hyperlipidemia  Migraine  Asthma  PONV  Trigeminal neuralgia  Anxiety   Ovarian cyst  TMJ dysfunction  Type II diabetes  Schizophrenia   Bilateral nephrolithiasis   TYRONE    Past Surgical History:    Ventral hernia repair  [place] teeth removal  Thoracoscopic wedge resection lung  Sinus surgery   Lung biopsy    Family History:    Father: Hypertension  Mother: Hyperlipidemia    Social History:  The patient presents alone via EMS.  She lives at [facility].     Physical Exam     Patient Vitals for the past 24 hrs:   BP Temp Pulse Resp SpO2 Height Weight   05/24/22 2100 108/40 97.52  F (36.4  C) 85 20 (!) 89 % -- --   05/24/22 2055 109/40 97.52  F (36.4  C) 86 20 91 % -- --   05/24/22 2050 117/41 97.52  F (36.4  C) 87 19 93 % -- --   05/24/22 2045 123/49 97.52  F (36.4  C) 89 19 94 % -- --   05/24/22 2040 123/56 97.52  F (36.4  C) 92 19 94 % -- --   05/24/22  "2036 -- 97.52  F (36.4  C) 93 19 94 % -- --   05/24/22 2035 124/54 97.52  F (36.4  C) 93 20 94 % -- --   05/24/22 2030 133/53 97.52  F (36.4  C) 93 16 94 % -- --   05/24/22 2025 122/55 97.52  F (36.4  C) 93 20 93 % -- --   05/24/22 2020 119/48 97.7  F (36.5  C) 93 22 93 % -- --   05/24/22 2015 114/54 97.52  F (36.4  C) 93 20 92 % -- --   05/24/22 2010 118/48 97.52  F (36.4  C) 93 19 93 % -- --   05/24/22 2005 122/49 97.7  F (36.5  C) 93 19 -- -- --   05/24/22 2000 113/55 97.7  F (36.5  C) 93 19 93 % -- --   05/24/22 1954 118/53 97.52  F (36.4  C) 93 20 93 % -- --   05/24/22 1951 111/44 97.7  F (36.5  C) 93 20 93 % -- --   05/24/22 1948 115/47 97.7  F (36.5  C) 93 19 93 % -- --   05/24/22 1946 -- 97.7  F (36.5  C) 93 21 93 % -- --   05/24/22 1945 105/46 -- 92 22 93 % -- --   05/24/22 1942 (!) 104/37 97.7  F (36.5  C) 93 26 93 % -- --   05/24/22 1939 109/44 97.7  F (36.5  C) 93 20 93 % -- --   05/24/22 1936 107/48 -- 94 20 93 % -- --   05/24/22 1933 107/45 97.7  F (36.5  C) 95 20 94 % -- --   05/24/22 1931 -- 97.7  F (36.5  C) 96 20 94 % -- --   05/24/22 1930 120/49 -- 96 19 94 % -- --   05/24/22 1927 119/51 97.88  F (36.6  C) 97 20 94 % -- --   05/24/22 1910 -- 97.88  F (36.6  C) 101 19 94 % -- --   05/24/22 1906 -- 98.06  F (36.7  C) 103 19 95 % -- --   05/24/22 1900 108/51 -- 104 -- 97 % -- --   05/24/22 1857 107/54 -- 106 -- -- -- --   05/24/22 1832 108/56 -- 109 -- -- -- --   05/24/22 1824 116/46 -- 114 19 96 % -- --   05/24/22 1820 -- -- 117 29 96 % -- --   05/24/22 1819 -- -- 118 20 96 % -- --   05/24/22 1818 111/49 -- 120 16 97 % -- --   05/24/22 1812 113/45 -- (!) 122 16 100 % 1.473 m (4' 10\") 105.7 kg (233 lb)   05/24/22 1809 111/44 -- (!) 123 21 100 % -- --   05/24/22 1806 108/41 -- (!) 122 16 100 % -- --   05/24/22 1803 105/44 -- 118 11 98 % -- --   05/24/22 1758 (!) 81/40 -- 84 15 97 % -- --   05/24/22 1755 (!) 66/27 -- 93 23 -- -- --   05/24/22 1751 (!) 72/33 -- 92 23 (!) 88 % -- --   05/24/22 1750 -- -- " 110 11 94 % -- 85 kg (187 lb 6.3 oz)   05/24/22 1746 -- -- (!) 122 (!) 101 97 % -- --       Physical Exam  Eyes:               Sclera white; Pupils are equal and round  ENT:                External ears and nares normal  CV:                  Rate as above with regular rhythm, joycelyn present, unable to feel pulses on arrival  Resp:               Breath sounds clear and equal bilaterally; igel aiway  GI:                   Abdomen is soft, non-distended  MS:                  No movement  Skin:                Warm and dry, not mottled  Neuro:             GCS 3    Emergency Department Course   ECG 1  ECG taken at 1747, ECG read at 1747  Sinus tachycardia  Nonspecific ST abnormality  Subendocardial injury   Subendocardial injury new as compared to prior, dated 11/11/2008.  Rate 121 bpm. KY interval 164 ms. QRS duration 104 ms. QT/QTc 310/440 ms. P-R-T axes 33 -10 39.     ECG  ECG taken at 1751, ECG read at 1752  Sinus tachycardia  Marked ST abnormality, possible inferior subendocardial injury  Abnormal ECG   No significant change as compared to first ECG.  Rate 102 bpm. KY interval 160 ms. QRS duration 96 ms. QT/QTc 356/463 ms. P-R-T axes 33 18 41.         Imaging:  XR Chest Port 1 View   Final Result   IMPRESSION:       Interval retraction of the endotracheal tube, with tip 1.9 cm above the symone. New enteric tube coursing into the stomach with tip out of view. New right IJ catheter with tip in the mid SVC.      Otherwise, no significant interval change.      CT Chest (PE) Abdomen Pelvis w Contrast   Final Result   IMPRESSION:   1.  No PE.   2.  The heart is enlarged.   3.  Tracheomalacia is noted.   4.  Nondisplaced fractures of the left anterior third and fifth ribs.    5.  Pulmonary opacities could be atelectasis or aspiration.       Cervical spine CT w/o contrast   Final Result   IMPRESSION:   1.  No fracture or posttraumatic subluxation.   2.  No high-grade spinal canal or neural foraminal stenosis.   3.   Straightening of the usual cervical lordosis.      Head CT w/o contrast   Final Result   IMPRESSION:   1.  Moderate diffuse low-density changes to the brain parenchyma worrisome for diffuse acute anoxic injury. There is loss of gray-white differentiation in the bilateral corpus stratum.   2.  No hemorrhage identified.   3.  Chronic infarct right parietal lobe.       Exam discussed with Dr. Garland at 19:09 hours         XR Chest Port 1 View   Final Result   IMPRESSION: The endotracheal tube appears to be just above the symone. Recommend retracting 4 cm for optimal positioning. Heterogeneous pulmonary opacities are new and could be an infectious or inflammatory process. The cardiomediastinal silhouette is    enlarged and is not well assessed. There is a large amount of air in the left upper quadrant; this may be in the stomach.       Discussed with Dr. Garland by Dr. Fahrner over telephone at 6:17 PM.       Echo Limited    (Results Pending)   POC US GUIDANCE NEEDLE PLACEMENT    (Results Pending)   XR Chest Port 1 View    (Results Pending)   Echocardiogram Complete    (Results Pending)     Report per radiology    Laboratory:  Labs Ordered and Resulted from Time of ED Arrival to Time of ED Departure   COMPREHENSIVE METABOLIC PANEL - Abnormal       Result Value    Sodium 138      Potassium 5.5 (*)     Chloride 101      Carbon Dioxide (CO2) 18 (*)     Anion Gap 19 (*)     Urea Nitrogen 25      Creatinine 1.32 (*)     Calcium 10.8 (*)     Glucose 254 (*)     Alkaline Phosphatase 119      AST 64 (*)     ALT 58 (*)     Protein Total 5.8 (*)     Albumin 2.5 (*)     Bilirubin Total 0.2      GFR Estimate 49 (*)    LACTIC ACID WHOLE BLOOD - Abnormal    Lactic Acid 15.0 (*)    INR - Abnormal    INR 1.36 (*)    CBC WITH PLATELETS AND DIFFERENTIAL - Abnormal    WBC Count 15.6 (*)     RBC Count 3.97      Hemoglobin 11.5 (*)     Hematocrit 40.4       (*)     MCH 29.0      MCHC 28.5 (*)     RDW 15.5 (*)     Platelet Count 180      GLUCOSE BY METER - Abnormal    GLUCOSE BY METER POCT 245 (*)    ISTAT GASES ELECTROLYTES VENOUS POCT - Abnormal    CPB Applied No      Hematocrit POCT 38      Bicarbonate Venous POCT 20 (*)     Hemoglobin POCT 12.9      Potassium POCT 5.5 (*)     Sodium POCT 137      pCO2 Venous POCT 82 (*)     pH Venous POCT 6.99 (*)     pO2 Venous POCT 94 (*)     O2 Sat, Venous POCT 91 (*)    ROUTINE UA WITH MICROSCOPIC - Abnormal    Color Urine Yellow      Appearance Urine Slightly Cloudy (*)     Glucose Urine Negative      Bilirubin Urine Negative      Ketones Urine Negative      Specific Gravity Urine 1.025      Blood Urine Trace (*)     pH Urine 6.0      Protein Albumin Urine 100  (*)     Urobilinogen Urine Normal      Nitrite Urine Negative      Leukocyte Esterase Urine Negative      Bacteria Urine Many (*)     Mucus Urine Present (*)     RBC Urine 6 (*)     WBC Urine 14 (*)     Squamous Epithelials Urine 3 (*)     Hyaline Casts Urine 2     BLOOD GAS VENOUS WITH OXYHEMOGLOBIN - Abnormal    pH Venous 7.11 (*)     pCO2 Venous 71 (*)     pO2 Venous 57 (*)     Bicarbonate Venous 23      FIO2 100      Oxyhemoglobin Venous 75      Base Excess/Deficit (+/-) -7.4     DIFFERENTIAL - Abnormal    % Neutrophils 48      % Lymphocytes 44      % Monocytes 7      % Eosinophils 1      % Basophils 0      Absolute Neutrophils 7.5      Absolute Lymphocytes 6.9 (*)     Absolute Monocytes 1.1      Absolute Eosinophils 0.2      Absolute Basophils 0.0      RBC Morphology Confirmed RBC Indices      Platelet Assessment        Value: Automated Count Confirmed. Platelet morphology is normal.   BLOOD GAS ARTERIAL WITH OXYHEMOGLOBIN - Abnormal    pH Arterial 7.23 (*)     pCO2 Arterial 47 (*)     pO2 Arterial 73 (*)     Bicarbonate Arterial 20 (*)     Oxyhemoglobin Arterial 90 (*)     Base Excess/Deficit (+/-) -8.0      FIO2 60     TROPONIN I - Normal    Troponin I High Sensitivity 11     PARTIAL THROMBOPLASTIN TIME - Normal    aPTT 30     ISTAT TROPONIN  POCT - Normal    TROPPC POCT 0.01     COVID-19 VIRUS (CORONAVIRUS) BY PCR - Normal    SARS CoV2 PCR Negative     TROPONIN I   GLUCOSE MONITOR NURSING POCT   CBC WITH PLATELETS   COMPREHENSIVE METABOLIC PANEL   BLOOD GAS ARTERIAL WITH OXYHEMOGLOBIN   TROPONIN I   CK TOTAL   TYPE AND SCREEN, ADULT    ABO/RH(D) O POS      Antibody Screen Negative      SPECIMEN EXPIRATION DATE [id number]     BLOOD CULTURE   BLOOD CULTURE   URINE CULTURE   ABO/RH TYPE AND SCREEN        [facility]    -Intubation    Date/Time: 5/24/2022 5:51 PM  Performed by: Stefany Garland MD  Authorized by: Stefany Garland MD     Emergent condition/consent implied      PRE-PROCEDURE DETAILS     Patient status:  Unresponsive    Paralytics:  Rocuronium      PROCEDURE DETAILS     Preoxygenation:  Bag valve mask    CPR in progress: no      Laryngoscope blade:  Mac 4    Tube size (mm):  7.5    Number of attempts:  2    Ventilation between attempts: yes    PLACEMENT ASSESSMENT     Breath sounds:  Equal    Placement verification: equal breath sounds      CXR findings:  ETT in proper place    PROCEDURE    Patient Tolerance:  Patient tolerated the procedure well with no immediate complicationsM [facility]    -Arterial Line    Date/Time: 5/24/2022 7:15 PM  Performed by: Stefany Garland MD  Authorized by: Stefany Garland MD     Emergent condition/consent implied      INDICATIONS:   Indications: hemodynamic monitoring and multiple ABGs      PRE-PROCEDURE DETAILS:   Skin preparation:  2% Chlorhexidine  PROCEDURE DETAILS:    Location:  R radial  Ronnie's test performed: no    Needle gauge:  20 G  Placement technique:  Seldinger and ultrasound guided  Number of attempts:  1  Transducer: waveform confirmed      POST PROCEDURE DETAILS:    Post-procedure:  Sutured  CMS:  Unchanged      PROCEDURE    Patient Tolerance:  Patient tolerated the procedure well with no immediate complicationsM  [facility]    -Central Line    Date/Time: 5/24/2022 7:15 PM  Performed by: Stefany Garland MD  Authorized by: Stefany Garland MD     Emergent condition/consent implied      PRE-PROCEDURE DETAILS:     Hand hygiene: Hand hygiene performed prior to insertion      Sterile barrier technique: All elements of maximal sterile technique followed      Skin preparation:  ChloraPrep    Skin preparation agent: Skin preparation agent completely dried prior to procedure         ANESTHESIA    Anesthesia: Local infiltration  Local Anesthetic:  Lidocaine 1% without epinephrine    PROCEDURE DETAILS:     Location:  R internal jugular    Site selection rationale:  Failed placement R femoral - difficult to visualize with US    Patient position:  Trendelenburg    Procedural supplies:  Triple lumen    Landmarks identified: yes      Ultrasound guidance: yes      Sterile ultrasound techniques: Sterile gel and sterile probe covers were used      Number of attempts:  2    Successful placement: yes      POST PROCEDURE DETAILS:      Post-procedure:  Dressing applied and line sutured    Assessment:  Blood return through all ports, free fluid flow, placement verified by x-ray and no pneumothorax on x-ray    PROCEDURE    Patient Tolerance:  Patient tolerated the procedure well with no immediate complications      Emergency Department Course:             Reviewed:  I reviewed nursing notes, vitals, past medical history and Care Everywhere    Assessments:  1740 I obtained history from EMS and examined the patient as noted above.   1741 Alex placed on patient for compressions.  1745 Ultrasound of heart done showing faint activity. Pulse check and felt pulse, compressions stopped.  1751 Critical lab value called in.   1756 Intubation as above.  1930 I rechecked the patient and performed central line and arterial line placement as above.    Consults:  1754 I spoke with the Cardiology service, regarding patient's  presentation, findings, and plan of care.     1800 I spoke with Dr. Vasquez of the IR service, regarding patient's presentation, findings, and plan of care.     1909 I spoke with the Radiology service, regarding patient's presentation, findings, and plan of care.     I discussed poor prognosis and patient wishes with her sister regarding ongoing goals of care.    2030 I spoke with Dr. Singleton of the Cardiology service about the patient's ECHO results.    2044 I spoke with Dr. Young of the Intensivist service, regarding patient's presentation, findings, and plan of care.     Dicussed organ donation with family at their request.    I spoke with Dr. Young again.    Interventions:  1743 sodium bicarbonate, 50 mEq, IV  1750 sodium bicarbonate, 50 mEq, IV  1757 adrenalin, 0.3 mcg/kg/min, IV  1800 sodium bicarbonate, 50 mEq, IV  1802 adrenalin, 0.4 mcg/kg/min, IV  1816 sodium bicarbonate, 50 mEq, IV  1904 Zosyn, 4.5, IV  1933 adrenalin, 0.3 mcg/kg/min, IV    Disposition:  The patient was admitted to the hospital under the care of Dr. Young.     Impression & Plan     Medical Decision Making:  Patient arrives had a after an out of hospital arrest with ROSC.  Prehospital EKG is suggestive of a possible STEMI with elevation in 1 and aVL.  However EKG here today shows diffuse depression more suggestive of subendocardial injury.  Case was discussed with interventional cardiology.  Since there is no history of V. fib and no localizing STEMI on EKG here, she is not appropriate for the Cath Lab at this time.  Stat echo was recommended.  She has multiple electrolyte abnormalities though not at a level I would typically consider as a potential cause of an arrest.  She received 5 units of IV insulin for the hyperkalemia.  Given her high lactic acid that is expected to be secondary to anoxia, but could be secondary to infection, IV antibiotics were given.  CT scans were obtained looking for potential causes of this as well as potential  injuries that may have been sustained.  CT of the head was discussed with radiology already showing changes of anoxic brain injury.  This was discussed with her sister who is now present.  She also did a conference phone call with 2 other siblings 1 of whom was able to come to the emergency department as well.  They have her healthcare directive with them.  This clearly states that she did not want life-prolonging interventions and feared being dependent.  She has an extremely poor prognosis based on already showing anoxic brain injury on the CT.  Furthermore she has not required any sedation and is not showing any movement in response to procedures that were performed in the emergency department.  ICU was made aware of the patient and family's wishes to be an organ donor.  Echo discussed with cardiology without focal WMA.  No indication for emergent cath lab activation.  Second troponin was noted to have elevated which was discussed with ICU as well.  Her ICU bed is already ready and decision was made not to delay transfer for heparin.  Ice packs were requested prior to transfer.  Rib fractures likely secondary to CPR.    Critical Care Time: was 75 minutes for this patient excluding procedures    Addendum: POC US results entered after receiving reminder for incomplete test     POC US GUIDANCE NEEDLE PLACEMENT   Final Result   Limited Bedside Vascular Ultrasound      Performed by: Dr. Garland   Indication: Central line and arterial line placement   Body area(s) imaged: Venous imaging: Right femoral - failed central line placement due to difficult visualization; Right internal jugular successful; Arterial imaging: Right radial arterial line successful.   Findings: Successful CVC placement R internal jugular and arterial line placement R radial   Impression: Same   Artery image archived to PACS; central line images were not saved            Diagnosis:    ICD-10-CM    1. Cardiac arrest (H)  I46.9    2. Anoxic brain  damage (H)  G93.1    3. Closed fracture of multiple ribs of left side, initial encounter  S22.42XA     3rd and 5th       Scribe Disclosure:  I, Joao Arias, am serving as a scribe at 5:49 PM on 5/24/2022 to document services personally performed by Stefany Garland MD based on my observations and the provider's statements to me.            Stefany Garland MD  05/24/22 2344       Stefany Garland MD  06/23/22 0118

## 2022-05-25 NOTE — CONSULTS
CLINICAL NUTRITION SERVICES - BRIEF NOTE    Received positive admission nutrition risk screen on 5/25/22 at 0638 -  Have you recently lost weight without trying? Unsure  Have you been eating poorly because of a decreased appetite? No    Chart reviewed. Noted patient admitted 2/2 cardiac arrest with resultant anoxic brain damage.   Per provider notes, patient continues on life support for organ donation only. No plans for aggressive nutrition interventions.     Full nutrition assessment not warranted at this time. Please page or consult as needed.     Jasmin Julien RD, LD  ICU RD Pager: [phone]

## 2022-05-25 NOTE — PROGRESS NOTES
[facility] ICU RESPIRATORY NOTE        Date of Admission: 5/24/2022    Date of Intubation (most recent):  5/24/22    Reason for Mechanical Ventilation: Cardiac arrest    Number of Days on Mechanical Ventilation: 2    Met Criteria for Spontaneous Breathing Trial: No    Reason for No Spontaneous Breathing Trial:  Per MD    Significant Events Today:  None    ABG Results:   Recent Labs   Lab 05/25/22  1732 05/25/22  1033 05/25/22  0501 05/24/22 2052   PH 7.46* 7.54* 7.63* 7.23*   PCO2 27* 31* 25* 47*   PO2 127* 124* 90 73*   HCO3 19* 26 27 20*   O2PER 40 40 40 60       Current Vent Settings: Vent Mode: CMV/AC  (Continuous Mandatory Ventilation/ Assist Control)  FiO2 (%): 40 %  Resp Rate (Set): 12 breaths/min  Tidal Volume (Set, mL): 450 mL  PEEP (cm H2O): 5 cmH2O  Resp: 18      Plan:  Pt to remain on full vent support overnight    José Manuel Jackson, RT

## 2022-05-25 NOTE — PROGRESS NOTES
Critical Care Progress Note      05/25/2022    Name: Lisa Marshall MRN#: [id number]   Age: 51 year old YOB: 1970     Hsptl Day# 1  ICU DAY #1    MV DAY #1             Problem List:   Active Problems:    Anoxic brain damage (H)    Cardiac arrest (H)           Summary/Hospital Course:   Patient is 51F who was found down and received CPR by EMS, achieved ROSC and was intubated. CT head consistent with anoxic brain injury and she has no reflexes off sedation except triggering the ventilator. [facility] have been involved and she is being evaluated for organ donation.       ICU Prophylaxis:   1. DVT: Hep Subq/ LMWH/mechanical  2. VAP: HOB 30 degrees, chlorhexidine rinse  3. Stress Ulcer: PPI/H2 blocker  4. Restraints: Nonviolent soft two point restraints required and necessary for patient safety and continued cares and good effect as patient continues to pull at necessary lines, tubes despite education and distraction. Will readdress daily.   5. IV Access - central access required and necessary for continued patient cares  6. Feeding - N/A  7. Family Update: Yes, called sister Carli   8. Disposition - Organ donation                  Interim History:   Triggering ventilator this AM, flow asynchrony noticed, continues to be unresponsive otherwise. BP 150s systolic with no pressors. No sedation.            Key Medications:       artificial tears   Both Eyes Q8H     chlorhexidine  15 mL Mouth/Throat Q12H     heparin ANTICOAGULANT  5,000 Units Subcutaneous Q8H     lactated ringers  1,000 mL Intravenous Once     phytonadione  5 mg Intravenous Once     piperacillin-tazobactam  4.5 g Intravenous Q6H     sodium phosphate  15 mmol Intravenous Q2H       dextrose       EPINEPHrine Stopped (05/25/22 0730)     insulin regular 2 Units/hr (05/25/22 0815)     - MEDICATION INSTRUCTIONS -       - MEDICATION INSTRUCTIONS -       propofol (DIPRIVAN) infusion                 Physical Examination:   Temp:  [97.34  F (36.3   C)-102.92  F (39.4  C)] 102.92  F (39.4  C)  Pulse:  [] 98  Resp:  [6-101] 24  BP: ()/(27-87) 162/54  MAP:  [40 mmHg-102 mmHg] 93 mmHg  Arterial Line BP: ()/(49-96) 98/85  FiO2 (%):  [60 %] 60 %  SpO2:  [88 %-100 %] 94 %    Intake/Output Summary (Last 24 hours) at 5/25/2022 0856  Last data filed at 5/25/2022 0600  Gross per 24 hour   Intake 1555.05 ml   Output 2305 ml   Net -749.95 ml     Wt Readings from Last 4 Encounters:   05/25/22 104.1 kg (229 lb 8 oz)   04/03/12 83.5 kg (184 lb)   07/31/10 78.5 kg (173 lb)   04/19/10 78.6 kg (173 lb 3.2 oz)     Arterial Line BP: ()/(49-96) 98/85  MAP:  [40 mmHg-102 mmHg] 93 mmHg  BP - Mean:  [] 122  CVP:  [0 mmHg-252 mmHg] 252 mmHg  Vent Mode: CMV/AC  (Continuous Mandatory Ventilation/ Assist Control)  FiO2 (%): 60 %  Resp Rate (Set): 18 breaths/min  Tidal Volume (Set, mL): 450 mL  PEEP (cm H2O): 5 cmH2O  Resp: 24    Recent Labs   Lab 05/25/22  0501 05/24/22 2052 05/24/22  1822 05/24/22  1750   PH 7.63* 7.23*  --  <7.00*   PCO2 25* 47*  --   --    PO2 90 73*  --   --    HCO3 27 20*  --   --    O2PER 40 60 100  --        GEN: Intubated and unresponsive   HEENT: head ncat, sclera anicteric, normal mucus membranes, trachea midline   PULM: Good AE bilaterally with no added sounds   CV/COR: RRR NL S1S2 no gallop, no rub, no murmur  ABD: soft nontender, hypoactive bowel sounds, no mass  EXT:  No edema, warm  NEURO: Sedated and intubated   SKIN: no obvious rash  LINES: clean, dry intact         Data:        ROUTINE ICU LABS (Last four results)  CMP  Recent Labs   Lab 05/25/22  0813 05/25/22  0649 05/25/22  0549 05/25/22  0457 05/24/22  2210 05/24/22 2052 05/24/22  1756 05/24/22  1751     --   --  140  --  142  --  138   POTASSIUM 3.8  --   --  3.3*  --  3.0*  --  5.5*   CHLORIDE 106  --   --  99  --  103  --  101   CO2 27  --   --  26  --  19*  --  18*   ANIONGAP 9  --   --  15*  --  20*  --  19*   * 203* 284* 318*   < > 289*   < > 254*    BUN  --   --   --  24  --  26  --  25   CR  --   --   --  1.33*  --  1.33*  --  1.32*   GFRESTIMATED  --   --   --  49*  --  49*  --  49*   MICHEAL  --   --   --  8.0*  --  8.6  --  10.8*   MAG  --   --   --  1.6  --   --   --   --    PHOS  --   --   --  0.8*  --   --   --   --    PROTTOTAL  --   --   --  6.3*  --  6.6*  --  5.8*   ALBUMIN  --   --   --  2.6*  --  2.8*  --  2.5*   BILITOTAL  --   --   --  1.1  --  0.8  --  0.2   ALKPHOS  --   --   --  151*  --  217*  --  119   AST  --   --   --  230*  --  137*  --  64*   ALT  --   --   --  119*  --  101*  --  58*    < > = values in this interval not displayed.     CBC  Recent Labs   Lab 05/25/22 0457 05/24/22  1751 05/24/22  1749   WBC 17.1* 15.6*  --    RBC 4.62 3.97  --    HGB 13.6 11.5* 12.9   HCT 42.4 40.4 38   MCV 92 102*  --    MCH 29.4 29.0  --    MCHC 32.1 28.5*  --    RDW 15.5* 15.5*  --     180  --      INR  Recent Labs   Lab 05/25/22  0457 05/24/22  1803   INR 1.67* 1.36*     Arterial Blood Gas  Recent Labs   Lab 05/25/22  0501 05/24/22  2052 05/24/22  1822 05/24/22  1750   PH 7.63* 7.23*  --  <7.00*   PCO2 25* 47*  --   --    PO2 90 73*  --   --    HCO3 27 20*  --   --    O2PER 40 60 100  --        All cultures:  No results for input(s): CULT in the last 168 hours.    Imaging:   CT head 5/24/22  IMPRESSION:  1.  Moderate diffuse low-density changes to the brain parenchyma worrisome for diffuse acute anoxic injury. There is loss of gray-white differentiation in the bilateral corpus stratum.  2.  No hemorrhage identified.  3.  Chronic infarct right parietal lobe.    Reviewed CXR image from yesterday  Mild pulmonary vascular congestion  Tip of Rt internal jugular line projects over the RA           Assessment and plan :     Lisa Marshall IS a 51 year old female admitted on 5/24/2022 for cardiac arrest, suspect to be secondary to respiratory failure apnea. She was found down and CPR was performed with ROSC, currently not requiring pressors and  "intubated. She is triggering the ventilator but otherwise non-responsive off sedation. CT head is consistent with anoxic brain injury findings. Left radial arterial line attempted but not successful.     1. Cardiac arrest  - F/u Echo  - Discussed with life source for potential organ donation       2. Acute hypoxic respiratory failure   Alkalotic on this AM's ABG with pH 7.63, she breathes over the vent.   - Reduce RR to 12 and add propofol gtt, titrate to slow down patient's RR     3. Anoxic brain injury   F/u neurology's input     4. Lactic acidosis   5. TYRONE, mild   Avoid nephrotoxins, renal dose meds   6. Morbid obesity   7. Leukocytosis  - Zosyn to cover for infectious etiology     8. Hypertension  Clinically Significant Risk Factors Present on Admission            # Hypoalbuminemia: Albumin = 2.6 g/dL (Ref range: 3.4 - 5.0 g/dL) on admission, will monitor as appropriate   # Coagulation Defect: INR = 1.67 (Ref range: 0.85 - 1.15) and/or PTT = 28 Seconds (Ref range: 22 - 38 Seconds) on admission, will monitor for bleeding    # Circulatory Shock: currently requiring pressors for blood pressure support  # Severe Obesity: Estimated body mass index is 47.97 kg/m  as calculated from the following:    Height as of this encounter: 1.473 m (4' 10\").    Weight as of this encounter: 104.1 kg (229 lb 8 oz).             I have personally reviewed the daily labs, imaging studies, cultures and discussed the case with referring physician and consulting physicians.     Jamie Choudhury MD    Billing: This patient is critically ill: Yes. Total critical care time today not including procedures was 45 min.            "

## 2022-05-25 NOTE — ED NOTES
Family at bedside inquiring about organ donation and state patient is a donor. Discussed that their is an organ and tissue donation team that I would be happy to contact and they would reach out to family to discuss options and details. Family requests to speak with Dr. Garland prior to making this phone call. MD is notified.

## 2022-05-25 NOTE — PROCEDURES
PROCEDURE NOTE      PREPROCEDURE DIAGNOSIS: transplant evaluation   POSTPROCEDURE DIAGNOSIS: transplant evaluation      PROCEDURE: arterial line placement - right femoral US guided     SURGEON: Dr. Antoni Link    ANESTHESIA: local     EBL: 5cc     FINDINGS: Arterial line in right femoral with good waveform     COMPLICATIONS: None apparent    OPERATIVE INDICATIONS: Lisa Marshall is a 51 year old female who is being evaluated for transplant. [facility] line to be placed since the previous one stopped functioning.        PROCEDURE DETAILS:   Informed consent was obtained prior to our procedure.  The patient's anatomical landmarks were reviewed prior to the procedure.   A timeout procedure was performed.    The patient was positioned supine with the right groin prepped and draped in sterile fashion.    The nurse monitored vital signs.    Sedation and analgesia were provided with local infiltration of lidocaine, <5 ml.  A strong pulse was palpated in the pt and dp on the right leg.   Under sterile technique, and ultrasound guidance, the right femoral artery was cannulated in a single pass.   A wire was passed through the needle.   The introducer needle was removed over the wire.    The arterial line was inserted over the wire.   The arterial line was attached to the pressure transducer tubing.   The arterial line was sutured to the skin.   A good waveform was demostrated.   A sterile dressing was placed over the arterial line.     Paramjit Shipley MD

## 2022-05-25 NOTE — PROCEDURES
PROCEDURE NOTE      PREPROCEDURE DIAGNOSIS: hypotension  POSTPROCEDURE DIAGNOSIS: hypotension     PROCEDURE: arterial line placement left radial      SURGEON: Dr. Kei Link     ANESTHESIA: local    EBL: 5cc     FINDINGS: Arterial line in left radial artery with good waveform     COMPLICATIONS: None apparent    OPERATIVE INDICATIONS: Lisa Marshall is a 51 year old female who is being evaluated for being a donor and lost her previous A line, prompting a placement of a new one.   Procedure was performed under emergent conditions given clinical picture.       PROCEDURE DETAILS:   The patient's anatomical landmarks were reviewed prior to the procedure.   A timeout procedure was performed.    The patient was positioned supine with the left forearm prepped and draped in sterile fashion.    The nurse monitored vital signs.    Sedation and analgesia were provided with local infiltration of lidocaine,<5 ml.  A strong pulse was palpated in the radial and ulnar pulse on the left.   Under sterile technique, and ultrasound guidance, with a single pass, the left radial artery was cannulated.   A wire was passed through the needle.   The introducer needle was removed over the wire.    The arterial line was inserted over the wire.   The arterial line was attached to the pressure transducer tubing.   The arterial line was sutured to the skin.   A good waveform was demostrated.   A sterile dressing was placed over the arterial line.     Paramjit Shipley MD

## 2022-05-25 NOTE — PROGRESS NOTES
Critical care updates    Discussed with neurology, intact brain stem but otherwise no cranial reflexes consistent with severe anoxic brain injury.   Called sister and updated her, they would like to proceed with withdrawal of support and organ donation.   Titrating propofol gtt for comfort and to relieve ventilator asynchrony.   Discussed with RN.     Critical care time not including procedures was 40 minutes.

## 2022-05-25 NOTE — CONSULTS
"      [facility]    Neurocritical Care Consult Note    Reason for Consult:  Anoxic Injury    Chief Complaint: Cardiac Arrest       HPI  Lisa Marshall is a 51 year old female with a history notable for DM-II, HTN, and schizophrenia who was admitted on 5/24/22 after being found unresponsive at her assisted living facility. She was noted to be in PEA arrest per EMS and required 10 minutes of CPR, epi x3, and bicarb x1 before ROSC. She was intubated on site PTA. CT head was concerning for anoxic brain injury.    Evaluation Summarized    MRI and/or Head CT 1.  Moderate diffuse low-density changes to the brain parenchyma worrisome for diffuse acute anoxic injury. There is loss of gray-white differentiation in the bilateral corpus stratum.  2.  No hemorrhage identified.  3.  Chronic infarct right parietal lobe.    Intracranial Vasculature N/A   Cervical Vasculature N/A     Echocardiogram Pending   EKG/Telemetry Sinus tachycardia with ST abnormality, possible inferior subendocardial injury.   Other Testing N/A     LDL No lab value available in past 30 days   A1C 5/25/2022: 6.4 %       Impression  Patient is here with likely anoxic brain injury. Per ICU team, Family would like to proceed with comfort care.    Thank you for this consult. No further evaluation is recommended. Please contact us with any questions.    The Stroke Fellow is Dr. Lindquist. The Stroke Staff is Dr. Diop.    Zaida Bravo  Medical Student  To page a member of the stroke/neurocritical care service, click here:  AMCOM   Choose \"On Call\" tab at top, then search dropdown box for \"Neurology Adult\", select location, press Enter, then look for stroke/neuro ICU/telestroke.    Physician Attestation   I, Chivo Lindquist MD, saw Lisa Marshall with the medical student and agree with the student's findings and plan of care as documented in the student's note.      I personally reviewed vital signs, medications, labs and imaging.    Key " findings/recomendations: As above    Chivo Lindquist MD   Vascular Neurology Fellow    Date of Service (when I saw the patient): 05/25/22    I, Chivo Lindquist MD, was present with the medical student who participated in obtaining the history, performing the exam, and in the documentation of the note.  I have verified the history, personally reviewed the vitals, labs, neuroimaging, medications, examined the patient, and led medical decision making.  I agree with the assessment and plan documented in the student's note.  My relevant summary and hummel findings are documented above.      _____________________________________________________    Clinically Significant Risk Factors Present on Admission             # Hypoalbuminemia: Albumin = 2.6 g/dL (Ref range: 3.4 - 5.0 g/dL) on admission, will monitor as appropriate  # Coagulation Defect: INR = 1.67 (Ref range: 0.85 - 1.15) and/or PTT = 28 Seconds (Ref range: 22 - 38 Seconds) on admission, will monitor for bleeding   # Coma: based on GCS score of <8    # Anoxic brain damage      Past Medical History   Past Medical History:   Diagnosis Date     Essential hypertension, benign     1995 after OCP's started, off now, pt had workup including cards eval and MRI/A     High cholesterol      Irregular menstrual cycle     Irregular periods     Lung disease     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 Lujan at [facility]     Past Surgical History   Past Surgical History:   Procedure Laterality Date     HERNIA REPAIR      ventral hernia repair 2012     SURGICAL HISTORY OF -   1/1990    wisdom teeth     THORACOSCOPIC WEDGE RESECTION LUNG  4/3/2012    Procedure:THORACOSCOPIC WEDGE  RESECTION LUNG; RIGHT VIDEO ASSISTED THORACOSCOPY, WEDGE RESECTION; Surgeon:XAVIER GAONA; Location: OR     [facility] NONSPECIFIC PROCEDURE  12/28/06    sinus surgery     Medications   Home Meds  Prior to Admission medications    Medication Sig Start Date End Date Taking? Authorizing Provider   Albuterol Sulfate (PROAIR HFA) 108 (90 BASE) MCG/ACT AERS Inhale 1-2 puffs into the lungs every 4 hours as needed. Medication has been refilled for one month.  Please call the clinic at [phone]  to schedule an office appointment with your provider to review this medication.       11/15/10  Yes Patricia Alan MD   amitriptyline (ELAVIL) 10 MG tablet Take 10 mg by mouth daily 4/8/22  Yes Reported, Patient   ARIPiprazole (ABILIFY) 15 MG tablet Take 15 mg by mouth daily 3/1/22  Yes Reported, Patient   atorvastatin (LIPITOR) 20 MG tablet Take 20 mg by mouth daily 4/18/22  Yes Reported, Patient   benztropine (COGENTIN) 1 MG tablet Take 1 mg by mouth 2 times daily 4/26/22  Yes Reported, Patient   carBAMazepine (CARBATROL) 300 MG 12 hr capsule Take 300 mg by mouth 2 times daily 7/17/20  Yes Reported, Patient   diltiazem ER (DILT-XR) 120 MG 24 hr capsule Take 120 mg by mouth 2 times daily 12/27/21  Yes Reported, Patient   FLUoxetine (PROZAC) 20 MG capsule Take 20 mg by mouth daily   Yes Unknown, Entered By History   gabapentin (NEURONTIN) 300 MG capsule Take 900 mg by mouth 2 times daily Morning and afternoon in addition to 1200 mg at bedtime   Yes Unknown, Entered By History   gabapentin (NEURONTIN) 300 MG capsule Take 1,200 mg by mouth At Bedtime In addition to 900 mg twice daily   Yes Unknown, Entered By History   lisinopril (ZESTRIL) 20 MG tablet Take 20 mg by mouth daily 4/25/22  Yes Reported, Patient   mometasone-formoterol (DULERA) 100-5 MCG/ACT inhaler Inhale 2 puffs into the lungs 2 times daily   Yes Unknown, Entered By History   montelukast (SINGULAIR) 10 MG tablet Take 10 mg by mouth daily   Yes Reported, Patient        Scheduled Meds    artificial tears   Both Eyes Q8H     chlorhexidine  15 mL Mouth/Throat Q12H     heparin ANTICOAGULANT  5,000 Units Subcutaneous Q8H     lactated ringers  1,000 mL Intravenous Once     pantoprazole  40 mg Per Feeding Tube QAM AC    Or     pantoprazole (PROTONIX) IV  40 mg Intravenous QAM AC     phytonadione  5 mg Intravenous Once     piperacillin-tazobactam  4.5 g Intravenous Q6H     sodium phosphate  15 mmol Intravenous Q2H       Infusion Meds    dextrose       EPINEPHrine Stopped (22 0730)     insulin regular 2 Units/hr (22 0815)     - MEDICATION INSTRUCTIONS -       - MEDICATION INSTRUCTIONS -       propofol (DIPRIVAN) infusion         PRN Meds  acetaminophen **OR** acetaminophen, dextrose, glucose **OR** dextrose **OR** glucagon, fentaNYL, fentaNYL, - MEDICATION INSTRUCTIONS -, - MEDICATION INSTRUCTIONS -, meperidine, metoprolol, naloxone **OR** naloxone **OR** naloxone **OR** naloxone, senna-docusate **OR** senna-docusate, vecuronium, vecuronium    Allergies   Allergies   Allergen Reactions     Topiramate      Other reaction(s): Intraocular Pressure Increase  ?tried for facial pain, d/c'd for seeing multiples x hrs (Dr. Lujan), closed angle glaucoma     Family History   Family History   Problem Relation Age of Onset     Hypertension Father      Allergies Father      Hypertension Paternal Grandfather      Lipids Mother      Lipids Maternal Grandfather      Allergies No family hx of      Anesthesia Reaction No family hx of         self     Gynecology No family hx of         self     Neurologic Disorder No family hx of      Lipids Maternal Grandfather      Heart Disease Maternal Grandmother          of heart attack 1972     Asthma No family hx of         self     Allergies Sister      Anesthesia Reaction Sister      Depression Sister      Social History   Social History     Tobacco Use     Smoking status: Never Smoker     Smokeless tobacco: Never Used   Substance Use  Topics     Alcohol use: Yes     Comment: social     Drug use: No       Review of Systems   Review of systems not obtained due to patient factors - mental status and intubation       PHYSICAL EXAMINATION   Temp:  [97.34  F (36.3  C)-102.92  F (39.4  C)] 102.92  F (39.4  C)  Pulse:  [] 98  Resp:  [6-101] 24  BP: ()/(27-87) 162/54  MAP:  [40 mmHg-102 mmHg] 93 mmHg  Arterial Line BP: ()/(49-96) 98/85  FiO2 (%):  [60 %] 60 %  SpO2:  [88 %-100 %] 94 %      [place] coma score:   GCS:   Motor 1=None   Verbal 1=None   Eye Opening 1=None   Total: 3        FOUR SCORE  Eye response: E0    E4 = eyelids open or opened, tracking, or blinking to command  E3 = eyelids open but not tracking  E2 = eyelids closed but open to loud voice  E1 = eyelids closed but open to pain  E0 = eyelids remain closed with pain.   Motor response: M0    M4 = thumbs-up, fist, or peace sign  M3 = localizing to pain  M2 = flexion response to pain  M1 = extension response to pain  MO = no response to pain or generalized myoclonus status.   Brainstem reflexes: B0    B4 = pupil and corneal reflexes present  B3 = one pupil wide and fixed  B2 = pupil or corneal reflexes absent  B1 = pupil and corneal reflexes absent  LESLEE = absent pupil, corneal, and cough reflex.   Respiration pattern: R1    R4 = not intubated, regular breathing pattern  R3 = not intubated, Cheyne-Melton breathing pattern  R2 = not intubated, irregular breathing  R1 = breathes above ventilatory rate  RO = breathes at ventilator rate or apnea.       Neuro  Mental Status: Intubated and sedated    Cranial Nerves:     Pupil exam: R: 2 mm L: 2 mm   Reactivity: absent absent   Corneal reflex: absent absent   Grimace/Facial movement absent    Cough absent    Gag absent         Motor Right Left   Upper Extremity 0/5 0/5   Lower Extremity 0/5 0/5   Sensory: NO response to painful stim NO response to painful stim   Coordination: Unable to obtain due to mental status Unable to obtain due  to mental status   DTR: Deferred Deferred   Gait: Deferred Deferred     General  Lungs Intubated, equal chest rise   Heart Regular rate   Abdomen Non-distended   Extremities Cold distal extremities   Skin No lesions noted     Imaging  I personally reviewed all imaging; relevant findings per HPI.    Labs Data   CBC  Recent Labs   Lab 05/25/22 0457 05/24/22 1751 05/24/22  1749   WBC 17.1* 15.6*  --    RBC 4.62 3.97  --    HGB 13.6 11.5* 12.9   HCT 42.4 40.4 38    180  --      Basic Metabolic Panel   Recent Labs   Lab 05/25/22  0813 05/25/22  0649 05/25/22  0549 05/25/22 0457 05/24/22 2210 05/24/22 2052 05/24/22 1756 05/24/22 1751     --   --  140  --  142  --  138   POTASSIUM 3.8  --   --  3.3*  --  3.0*  --  5.5*   CHLORIDE 106  --   --  99  --  103  --  101   CO2 27  --   --  26  --  19*  --  18*   BUN  --   --   --  24  --  26  --  25   CR  --   --   --  1.33*  --  1.33*  --  1.32*   * 203* 284* 318*   < > 289*   < > 254*   MICHEAL  --   --   --  8.0*  --  8.6  --  10.8*    < > = values in this interval not displayed.     Liver Panel  Recent Labs   Lab 05/25/22 0457 05/24/22 2052 05/24/22 1751   PROTTOTAL 6.3* 6.6* 5.8*   ALBUMIN 2.6* 2.8* 2.5*   BILITOTAL 1.1 0.8 0.2   ALKPHOS 151* 217* 119   * 137* 64*   * 101* 58*     INR    Recent Labs   Lab Test 05/25/22 0457 05/24/22 1803   INR 1.67* 1.36*      Lipid Profile  No lab results found.  A1C    Recent Labs   Lab Test 05/25/22 0457   A1C 6.4*     Troponin I    Recent Labs   Lab 05/25/22  0457 05/24/22  2052 05/24/22  1751   TROPONINIS 342* 265* 11          Stroke Consult Data Data   This was a non-emergent, non-telestroke consult.

## 2022-05-25 NOTE — PROGRESS NOTES
[facility] ICU RESPIRATORY NOTE        Date of Admission: 5/24/2022    Date of Intubation (most recent): 5/24/22    Reason for Mechanical Ventilation: Resp Distress    Number of Days on Mechanical Ventilation: 2    Met Criteria for Spontaneous Breathing Trial: No    Reason for No Spontaneous Breathing Trial: Organ donation    Significant Events Today: none    ABG Results:   Recent Labs   Lab 05/25/22  0501 05/24/22 2052 05/24/22  1822   PH 7.63* 7.23*  --    PCO2 25* 47*  --    PO2 90 73*  --    HCO3 27 20*  --    O2PER 40 60 100       Current Vent Settings:     Vent Mode: CMV/AC  (Continuous Mandatory Ventilation/ Assist Control)  FiO2 (%): 60 %  Resp Rate (Set): 25 breaths/min  Tidal Volume (Set, mL): 450 mL  PEEP (cm H2O): 5 cmH2O  Resp: (!) 6    Plan: [facility]    STEPHANIE HILLIARD, RT

## 2022-05-25 NOTE — PROGRESS NOTES
Family conference held with patient's two sisters and brother- in- law and goals of care discussed. We discussed about her clinical condition and poor prognosis and very grime chance of meaningful recovery given the concern for anoxic brain injury following cardiac arrest. They do not want aggressive care. We also discussed that hypothermia protocol will not be in place based on goals of care.  They will like to continue life support towards organ donation only. She is now a DNR        Isra Young MD  Pulmonary, Critical Care and Sleep Medicine  [facility]  Pager: [phone]

## 2022-05-25 NOTE — PROVIDER NOTIFICATION
MD Young notified about Critical PH, notified MD young about critical phos. Md contacted for orders for cooling blanket as well given fever is still increasing after CO tylenol.

## 2022-05-25 NOTE — PLAN OF CARE
AVSS on mech vent. Remains unresponsive to stimulus; pupils fixed; Forward head jerking movement; MD aware. Febrile; cooling blanket initiated; [place] tylenol given. BP stable; off epi most of night; restarted 0630, no nonverbal indicators of pain. LS dim. Diet NPO. Up with assist of 1. BS dim. Luis with adequate urine output.

## 2022-05-25 NOTE — H&P
Critical Care  Note      05/24/2022    Name: Lisa Marshall MRN#: [id number]   Age: 51 year old YOB: 1970     Hsptl Day# 0  ICU DAY #    MV DAY #             Problem List:   Active Problems:    Anoxic brain damage (H)    Cardiac arrest (H)           Summary/Hospital Course:   Lisa Marshall is a 51 year old female with history of Type II diabetes, hypertension, TYRONE and schizophrenia who was found down at her assisted living facility by staff and was noted to be in PEA arrest as per EMS. Downtime unknown and she required CPR  for about 10 minutes including 3 round of epi and 1 round of bicarb prior to ROSC.  They noted her blood pressure to be in the 70's. She was intubated in the field and brought to the ED.  Cause of arrest is unclear based on evaluations in the ED, chest pulmonary CT is negative for PE but with bilateral opacities, EG showed diffuse ST depression particularly in the inferior leads but limited echo showed dynamic LV as per ER physician, neg troponin. Cardiology consulted in the ED and no interventions recommended. Unfortunately, CT head concerning for anoxic brain injury.  Patient is transferred to the ICU for further care      Assessment and plan :     I have personally reviewed the daily labs, imaging studies, cultures and discussed the case with referring physician and consulting physicians.   My assessment and plan by system for this patient is as follows:    Neurology/Psychiatry:   1. Concern for anoxic brain injury  2. ICU Pain/analgesia  3. Hx of schizophrenia  4. Hx of migraine  Plan  - Will continue to monitor neurological function very closely   - Consult to neuro ICU for concern of anoxic brain injury  - Will hold of sedation now to properly access neurological function  - Continue Fentanyl for analgesia  - Hold psych meds    Cardiovascular:   1. Shock-may be related to cardiac stunning as a result of cardiac arrest, possibly shock due to aspiration pneumonia  2. Post cardiac  arrest- most likely respiratory arrest given the abnormal ct chest findings  3. Hx of hypertension  Plan  - wean off pressors as tolerated  - Post cardiac arrest care including hypothermia protocol  - Hold anti hypertensives  - Complete echocardiogram in the morning, limited echo is reassuring  - Cardiology consult    Pulmonary/Ventilator Management:   1. Respiratory failure requiring intubation  2. Bilateral pulmonary opacities - ?aspiration pneumonia  3. Hx of asthma  Plan  - Continue vent care and wean off as tolerated  - Antibiotics to cover aspiration pneumonia- Zosyn. Will also consider bronchoscopy if oxygen requirement is very significant.  - Bronchodilators    GI and Nutrition :   1. PPI prophylaxis  2. Enteral feedings  3.   Plan  -Pantoprazole 40 mg daily  - Nutrition consult    Renal/Fluids/Electrolytes:   1. TYRONE  2. Severe lactic acidosis  3. Mixed respiratory and anion gap metabolic acidosis  4. Hyperkalemia  5. Hypovolemia    Plan  - Follow lactic acid  - Post ROSC fluid resuscitation  - monitor function and electrolytes as needed with replacement per ICU protocols. - generally avoid nephrotoxic agents such as NSAID, IV contrast unless specifically required  - adjust medications as needed for renal clearance  - follow I/O's as appropriate.    Infectious Disease:   1. Aspiration pneumonia  2. sepsis, source control  Plan  - Sputum cultures and possible bronch if needed in the am  - Procalcitonin and blood cultures    Endocrine:   1. Stress induced hyperglycemia  Plan  - ICU insulin protocol, goal sugar <180    Hematology/Oncology:   1. No issues       IV/Access:   1. Venous access - Right IJ  2. Arterial access - Radial line  3.  Plan  - central access required and necessary      ICU Prophylaxis:   1. DVT: Hep Subq  2. VAP: HOB 30 degrees, chlorhexidine rinse  3. Stress Ulcer: PPI  4. Restraints: Nonviolent soft two point restraints required and necessary for patient safety and continued cares and good  effect as patient continues to pull at necessary lines, tubes despite education and distraction. Will readdress daily.   5. Wound care  - None  6. Feeding - Feeding tube  7. Family Update: Yes  8. Disposition - Continue ICU care        Key goals for next 24 hours:   1. Place thermo guard and initiate cooling  2. Discuss goals of care  3. Further evaluations for cause of cardiac arrest               Medical History:     Past Medical History:   Diagnosis Date     Essential hypertension, benign     1995 after OCP's started, off now, pt had workup including cards eval and MRI/A     High cholesterol      Irregular menstrual cycle     Irregular periods     Lung disease     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 Lujan at [facility]     Past Surgical History:   Procedure Laterality Date     HERNIA REPAIR      ventral hernia repair 2012     SURGICAL HISTORY OF -   1/1990    wisdom teeth     THORACOSCOPIC WEDGE RESECTION LUNG  4/3/2012    Procedure:THORACOSCOPIC WEDGE RESECTION LUNG; RIGHT VIDEO ASSISTED THORACOSCOPY, WEDGE RESECTION; Surgeon:XAVIER GAONA; Location: OR     [facility] NONSPECIFIC [facility]ED[facility]  12/28/06    sinus surgery     Social History     Socioeconomic History     Marital status: Single     Spouse name: Not on file     Number of children: Not on file     Years of education: Not on file     Highest education level: Not on file   Occupational History     Employer: SELF   Tobacco Use     Smoking status: Never Smoker     Smokeless tobacco: Never Used   Substance and Sexual Activity     Alcohol use: Yes     Comment: social     Drug use: No     Sexual activity: Never   Other Topics Concern     Parent/sibling w/  CABG, MI or angioplasty before 65F 55M? Not Asked   Social History Narrative    Social Documentation:        Balanced Diet: YES    Calcium intake: 2-3 per day    Caffeine: 0-1 per day    Exercise:  type of activity walk;  5-6 times per week    Sunscreen: Yes    Seatbelts:  Yes    Self Breast Exam:  No -     Self Testicular Exam: No - n/a    Physical/Emotional/Sexual Abuse: No -     Do you feel safe in your environment? Yes        Cholesterol screen up to date: No - not fasting    Eye Exam up to date: Yes    Dental Exam up to date: Yes    Pap smear up to date: Yes will be today    Mammogram up to date: Does Not Apply    Dexa Scan up to date: Does Not Apply    Colonoscopy up to date: Does Not Apply    Immunizations up to date: Yes 2000    Glucose screen if over 40:  No - n/a     Social Determinants of Health     Financial Resource Strain: Not on file   Food Insecurity: Not on file   Transportation Needs: Not on file   Physical Activity: Not on file   Stress: Not on file   Social Connections: Not on file   Intimate Partner Violence: Not on file   Housing Stability: Not on file        Allergies   Allergen Reactions     Topiramate      Other reaction(s): Intraocular Pressure Increase  ?tried for facial pain, d/c'd for seeing multiples x hrs (Dr. Lujan), closed angle glaucoma              Key Medications:       chlorhexidine  15 mL Mouth/Throat Q12H     heparin ANTICOAGULANT  5,000 Units Subcutaneous Q8H     [START ON 5/25/2022] pantoprazole  40 mg Per Feeding Tube QAM AC    Or     [START ON 5/25/2022] pantoprazole (PROTONIX) IV  40 mg Intravenous QAM AC     vancomycin  2,000 mg Intravenous Once       EPINEPHrine 0.3 mcg/kg/min (05/24/22 2044)     sodium bicabonate in 5% dextrose for infusion 150 mL/hr at 05/24/22 1859        Home Meds  No current facility-administered medications on file prior to encounter.  Albuterol Sulfate (PROAIR HFA) 108 (90 BASE) MCG/ACT AERS, Inhale 1-2 puffs into the lungs every 4 hours as  needed. Medication has been refilled for one month.  Please call the clinic at [phone]  to schedule an office appointment with your provider to review this medication.        amitriptyline (ELAVIL) 10 MG tablet, Take 10 mg by mouth daily  ARIPiprazole (ABILIFY) 15 MG tablet, Take 15 mg by mouth daily  atorvastatin (LIPITOR) 20 MG tablet, Take 20 mg by mouth daily  benztropine (COGENTIN) 1 MG tablet, Take 1 mg by mouth 2 times daily  carBAMazepine (CARBATROL) 300 MG 12 hr capsule, Take 300 mg by mouth 2 times daily  diltiazem ER (DILT-XR) 120 MG 24 hr capsule, Take 120 mg by mouth 2 times daily  FLUoxetine (PROZAC) 20 MG capsule, Take 20 mg by mouth daily  gabapentin (NEURONTIN) 300 MG capsule, Take 900 mg by mouth 2 times daily Morning and afternoon in addition to 1200 mg at bedtime  gabapentin (NEURONTIN) 300 MG capsule, Take 1,200 mg by mouth At Bedtime In addition to 900 mg twice daily  lisinopril (ZESTRIL) 20 MG tablet, Take 20 mg by mouth daily  mometasone-formoterol (DULERA) 100-5 MCG/ACT inhaler, Inhale 2 puffs into the lungs 2 times daily  montelukast (SINGULAIR) 10 MG tablet, Take 10 mg by mouth daily               Physical Examination:   Temp:  [97.52  F (36.4  C)-98.06  F (36.7  C)] 97.52  F (36.4  C)  Pulse:  [] 87  Resp:  [] 19  BP: ()/(27-56) 117/41  MAP:  [87 mmHg-102 mmHg] 87 mmHg  Arterial Line BP: (107-147)/(63-94) 144/63  SpO2:  [88 %-100 %] 93 %  No intake or output data in the 24 hours ending 05/24/22 2100  Wt Readings from Last 4 Encounters:   05/24/22 105.7 kg (233 lb)   04/03/12 83.5 kg (184 lb)   07/31/10 78.5 kg (173 lb)   04/19/10 78.6 kg (173 lb 3.2 oz)     Arterial Line BP: (107-147)/(63-94) 144/63  MAP:  [87 mmHg-102 mmHg] 87 mmHg  BP - Mean:  [36-91] 62  Resp: 19    Recent Labs   Lab 05/24/22 2052 05/24/22 1822   PH 7.23*  --    PCO2 47*  --    PO2 73*  --    HCO3 20*  --    O2PER 60 100       GEN: Intubated  HEENT: head ncat, sclera anicteric, OP patent,  trachea midline   PULM: unlabored synchronous with vent, coarse breath sounds  CV/COR: RRR S1S2 no gallop,  No rub, no murmur  ABD: soft nontender, hypoactive bowel sounds, no mass  EXT:  No edema   warm  NEURO: Unresponsive  SKIN: no obvious rash  LINES: clean, dry intact         Data:   All data and imaging reviewed     ROUTINE ICU LABS (Last four results)  CMP  Recent Labs   Lab 05/24/22 1756 05/24/22 1751 05/24/22  1749   NA  --  138 137   POTASSIUM  --  5.5* 5.5*   CHLORIDE  --  101  --    CO2  --  18*  --    ANIONGAP  --  19*  --    * 254*  --    BUN  --  25  --    CR  --  1.32*  --    GFRESTIMATED  --  49*  --    MICHEAL  --  10.8*  --    PROTTOTAL  --  5.8*  --    ALBUMIN  --  2.5*  --    BILITOTAL  --  0.2  --    ALKPHOS  --  119  --    AST  --  64*  --    ALT  --  58*  --      CBC  Recent Labs   Lab 05/24/22 1751 05/24/22  1749   WBC 15.6*  --    RBC 3.97  --    HGB 11.5* 12.9   HCT 40.4 38   *  --    MCH 29.0  --    MCHC 28.5*  --    RDW 15.5*  --      --      INR  Recent Labs   Lab 05/24/22  1803   INR 1.36*     Arterial Blood Gas  Recent Labs   Lab 05/24/22 2052 05/24/22  1822   PH 7.23*  --    PCO2 47*  --    PO2 73*  --    HCO3 20*  --    O2PER 60 100       All cultures:  No results for input(s): CULT in the last 168 hours.  Recent Results (from the past 24 hour(s))   XR Chest Port 1 View    Narrative    EXAM: XR CHEST PORT 1 VIEW  LOCATION: [facility]  DATE/TIME: 5/24/2022 6:00 PM    INDICATION: intubation  COMPARISON: 04/05/2012       Impression    IMPRESSION: The endotracheal tube appears to be just above the symone. Recommend retracting 4 cm for optimal positioning. Heterogeneous pulmonary opacities are new and could be an infectious or inflammatory process. The cardiomediastinal silhouette is   enlarged and is not well assessed. There is a large amount of air in the left upper quadrant; this may be in the stomach.     Discussed with Dr. Garland by   Fahrner over telephone at 6:17 PM.    Head CT w/o contrast    Narrative    EXAM: CT HEAD W/O CONTRAST  LOCATION: [facility]  DATE/TIME: 5/24/2022 6:39 PM    INDICATION: Cerebral hemorrhage suspected  COMPARISON: MRI brain 11/19/2020  TECHNIQUE: Routine CT Head without IV contrast. Multiplanar reformats. Dose reduction techniques were used.    FINDINGS:  INTRACRANIAL CONTENTS: No intracranial hemorrhage. The parenchyma is low-density there is some loss of gray-white differentiation . No hydrocephalus. Chronic infarct right parietal lobe. Normal parenchymal attenuation. Normal ventricles and sulci.     VISUALIZED ORBITS/SINUSES/MASTOIDS: No intraorbital abnormality. No paranasal sinus mucosal disease. No middle ear or mastoid effusion.    BONES/SOFT TISSUES: No acute abnormality.      Impression    IMPRESSION:  1.  Moderate diffuse low-density changes to the brain parenchyma worrisome for diffuse acute anoxic injury. There is loss of gray-white differentiation in the bilateral corpus stratum.  2.  No hemorrhage identified.  3.  Chronic infarct right parietal lobe.     Exam discussed with Dr. Garland at 19:09 hours     Cervical spine CT w/o contrast    Narrative    EXAM: CT CERVICAL SPINE W/O CONTRAST  LOCATION: [facility]  DATE/TIME: 5/24/2022 6:38 PM    INDICATION: Neck trauma, focal neuro deficit or paresthesia (Age 16-64y)  COMPARISON: 3/10/2015  TECHNIQUE: Routine CT Cervical Spine without IV contrast. Multiplanar reformats. Dose reduction techniques were used.    FINDINGS:  VERTEBRA: Straightening of the usual cervical lordosis. Mild dextroscoliosis. Vertebral body height and alignment is preserved. No prevertebral edema. No fracture or posttraumatic subluxation.     CANAL/FORAMINA: No canal or neural foraminal stenosis.    PARASPINAL: No extraspinal abnormality.      Impression    IMPRESSION:  1.  No fracture or posttraumatic subluxation.  2.  No high-grade  spinal canal or neural foraminal stenosis.  3.  Straightening of the usual cervical lordosis.   CT Chest (PE) Abdomen Pelvis w Contrast    Narrative    EXAM: CT CHEST PE ABDOMEN PELVIS W CONTRAST  LOCATION: [facility]  DATE/TIME: 5/24/2022 6:38 PM    INDICATION: tachycardic, pea arrest w ROSC  COMPARISON: None.  TECHNIQUE: CT chest pulmonary angiogram and routine CT abdomen pelvis with IV contrast. Arterial phase through the chest and venous phase through the abdomen and pelvis. Multiplanar reformats and MIP reconstructions were performed. Dose reduction   techniques were used.   CONTRAST: 98 mL Isovue 370    FINDINGS:  ANGIOGRAM CHEST: Pulmonary arteries are normal caliber and negative for pulmonary emboli. Thoracic aorta is negative for dissection.    LUNGS AND PLEURA: Tracheomalacia is noted. An endotracheal tube tip is 1.3 cm from the symone. There are groundglass and more confluent opacities throughout the lungs. Atelectasis is present.     MEDIASTINUM/AXILLAE: The heart is mildly enlarged.     CORONARY ARTERY CALCIFICATION: None.    HEPATOBILIARY: Normal.     PANCREAS: Normal.    SPLEEN: Normal.    ADRENAL GLANDS: Normal.    KIDNEYS/BLADDER: 2 mm nonobstructing calculus at the lower pole of the left kidney. Small calcifications at the lower pole of the left kidney likely also represent nonobstructing calculi. There is a Luis catheter in the urinary bladder.     BOWEL: An NG tube is in the stomach. A few small bowel loops are distended with a diameter of 3.0 cm. Air-fluid levels are present. There is no transition point.     LYMPH NODES: Normal.    VASCULATURE: There are a few air locules in the pelvic veins in the groin.     PELVIC ORGANS: Normal.    MUSCULOSKELETAL: There are nondisplaced fractures of the left anterior third and fifth ribs.       Impression    IMPRESSION:  1.  No PE.  2.  The heart is enlarged.  3.  Tracheomalacia is noted.  4.  Nondisplaced fractures of the left  anterior third and fifth ribs.   5.  Pulmonary opacities could be atelectasis or aspiration.          Billing: This patient is critically ill: Yes. Total critical care time today 60 min.   Patient is very critical and prognosis is very poor

## 2022-05-25 NOTE — PROGRESS NOTES
SPIRITUAL HEALTH SERVICES  SPIRITUAL ASSESSMENT Progress Note  [facility] ICU     REFERRAL SOURCE: [facility]    I spent time connecting with pt Lisa's sister, Carli via phone today. I introduced self/role and shs availability.     -Carli names their desire to honor Lisa's wishes as an organ donor. I oriented her to the availability of a flag raising service, which Carli shares they as a family would like to do to honor Lisa. We set a tentative time for 10 am tomorrow, with flexibility depending on when Lisa goes to the OR.     -Carli spent some time processing these last hours and their desire as a family to have time together with Lisa. Her other sister will be arriving this evening.    -Carli names Lisa's Religious asha is deeply important to her and requested she receive Religious SOA, which will be provided by a   this evening.     I spent time providing emotional and spiritual support through reflective listening that affirmed emotions, experiences and meaning.     PLAN: [facility] will continue to follow.     Alicja Izaguirre  Associate    Phone: [phone]  [facility] On Call Pager: [phone]

## 2022-05-25 NOTE — PROGRESS NOTES
[facility] Note:    Donor Evaluation and Work-Up    Ms. Marshall has designated herself as a donor and this decision is supported by her LNOK, sister Carli. She meets criteria for Donation after Circulatory Death      Organs being evaluated:    Liver and bilateral kidneyes    Research : Authorized    Testing/imaging is needed based on each organ system. The following tests are anticipated.       Liver    o Abdominal ultrasound    o CT abdomen/pelvis (COMPLETE)    o Routine lab work (Q6H)    Kidneys    Routine Lab work  Hourly UOP, maintain between  ml/hr    Shift Summary and Expectations:      Family Needs/Requests: Family plans to gather at bedside this evening for final goodbyes (05/25 around 1700)      Plan for this shift: [facility] will be on site this shift - Coordinator Azeb is in ICU conference room       Notify the Donation Coordinator if below parameters are not being met:    o Cardiac      Systolic blood pressure goal  mmHg      Mean arterial pressure goal: >70 mmHg      Heart rate goal  bpm    o Respiratory      Oxygen Sat goal >95%     PO2 > 100    o Liver      Serum sodium level < 155 mEq/L    Thank you for your care of this donor and her family,    Azeb  [facility]

## 2022-05-25 NOTE — PLAN OF CARE
Pt tolerating vent settings, VSS. Pt remains unresponsive, pupils are fixed. Unable to declare brain death due to over breathing on the ventilator. Started sedation to keep pt comfortable per intensivist request. Family continues to proceed with [facility]. Pressors remain off, SBP goal <150. Temperature improved with cooling blanket TMAX 104 this morning. Luis with adequate UOP. Family and  at bedside. Emotional support provided, will continue to monitor.

## 2022-05-25 NOTE — PROGRESS NOTES
[facility] Note:    Allocation    Organs intended for Donation:    Heart ___ Lungs ___ Liver _X__ Kidneys _X__ Pancreas ___ Intestine _X__    OR Estimate: Date __5/26___    [facility] (will/will not) be on-site:    Currently LS is working remotely. Will be onsite for any instability, staff request and/or for the OR.     Delays: None at this

## 2022-05-26 NOTE — PROGRESS NOTES
AVSS on mech vent. Remains unresponsive to stimulus; pupils fixed; MD aware. Febrile; cooling blanket continued; BP stable on lego gtt, Hemodynamic monitoring continued; 2L LR given with good result. no nonverbal indicators of pain. LS dim coarse. Diet NPO. Up with assist of 1. BS dim. Luis with adequate urine output.

## 2022-05-26 NOTE — PROGRESS NOTES
SPIRITUAL HEALTH SERVICES  SPIRITUAL ASSESSMENT Progress Note  [facility] ICU     REFERRAL SOURCE: Flag Raising Service    I spent time with Lisa's family today along with ICU leadership. Her family included three sisters and partners. We held a service for Lisa outside near the flag pole to honor her life and her as an organ donor. I spent time providing emotional and spiritual support.     PLAN: [facility] will continue to remain available. Family is aware of our availability for further support.     Alicja Izaguirre  Associate    Phone: [phone]  [facility] On Call Pager: [phone]

## 2022-05-26 NOTE — PROGRESS NOTES
Critical Care Progress Note      05/26/2022    Name: Lisa Marshall MRN#: [id number]   Age: 51 year old YOB: 1970     Hsptl Day# 2  ICU DAY #2    MV DAY #2             Problem List:   Active Problems:    Anoxic brain damage (H)    Cardiac arrest (H)           Summary/Hospital Course:   Patient is 51F who was found down and received CPR by EMS, achieved ROSC and was intubated. CT head consistent with anoxic brain injury and she has no reflexes off sedation except triggering the ventilator. [facility] have been involved and she is being evaluated for organ donation.       ICU Prophylaxis:   1. DVT: Hep Subq  2. VAP: HOB 30 degrees, chlorhexidine rinse  3. Stress Ulcer: PPI/H2 blocker  4. Restraints: Nonviolent soft two point restraints required and necessary for patient safety and continued cares and good effect as patient continues to pull at necessary lines, tubes despite education and distraction. Will readdress daily.   5. IV Access - central access required and necessary for continued patient cares  6. Feeding - N/A, patient going to OR tonight   7. Family Update: Family updated   8. Disposition - Organ donation              Interim History:   Sedated with propofol for excessive ventilator triggering, hypotensive this AM and receiving one liter of LR. Acid-base acceptable.          Key Medications:       artificial tears   Both Eyes Q8H     chlorhexidine  15 mL Mouth/Throat Q12H     heparin ANTICOAGULANT  5,000 Units Subcutaneous Q8H     insulin aspart  1-6 Units Subcutaneous Q4H     lactated ringers  1,000 mL Intravenous Once     piperacillin-tazobactam  4.5 g Intravenous Q6H       dextrose       EPINEPHrine Stopped (05/26/22 0351)     lactated ringers 50 mL/hr at 05/26/22 1149     - MEDICATION INSTRUCTIONS -       - MEDICATION INSTRUCTIONS -       norepinephrine 0.19 mcg/kg/min (05/26/22 1206)     propofol (DIPRIVAN) infusion 35 mcg/kg/min (05/26/22 1040)               Physical Examination:   Temp:   [98.06  F (36.7  C)-102.38  F (39.1  C)] 98.24  F (36.8  C)  Pulse:  [] 105  Resp:  [9-33] 9  BP: ()/(41-76) 98/41  MAP:  [26 mmHg-164 mmHg] 73 mmHg  Arterial Line BP: ()/() 103/54  SpO2:  [97 %-100 %] 100 %    Intake/Output Summary (Last 24 hours) at 5/25/2022 0856  Last data filed at 5/25/2022 0600  Gross per 24 hour   Intake 1555.05 ml   Output 2305 ml   Net -749.95 ml     Wt Readings from Last 4 Encounters:   05/26/22 106.8 kg (235 lb 7.2 oz)   04/03/12 83.5 kg (184 lb)   07/31/10 78.5 kg (173 lb)   04/19/10 78.6 kg (173 lb 3.2 oz)     Arterial Line BP: ()/() 103/54  MAP:  [26 mmHg-164 mmHg] 73 mmHg  BP - Mean:  [59-98] 59  CVP:  [0 mmHg-8 mmHg] 6 mmHg  Vent Mode: CMV/AC  (Continuous Mandatory Ventilation/ Assist Control)  FiO2 (%): 40 %  Resp Rate (Set): 12 breaths/min  Tidal Volume (Set, mL): 430 mL  PEEP (cm H2O): 5 cmH2O  Resp: 9    Recent Labs   Lab 05/26/22  1134 05/26/22  0512 05/26/22  0251 05/25/22  2321   PH 7.36 7.49* 7.54* 7.54*   PCO2 37 27* 25* 28*   PO2 126* 89 97 121*   HCO3 21 21 22 24   O2PER 40 40 40 40       GEN: Intubated and unresponsive   HEENT: head ncat, sclera anicteric, normal mucus membranes, trachea midline   PULM: Good AE bilaterally with no added sounds   CV/COR: RRR NL S1S2 no gallop, no rub, no murmur  ABD: soft nontender, hypoactive bowel sounds, no mass  EXT:  No edema, warm  NEURO: Sedated and intubated   SKIN: no obvious rash  LINES: clean, dry intact         Data:        ROUTINE ICU LABS (Last four results)  CMP  Recent Labs   Lab 05/26/22  1133 05/26/22  1130 05/26/22  0941 05/26/22  0834 05/26/22  0518 05/26/22  0507 05/26/22  0147 05/25/22  2317 05/25/22  1741 05/25/22  1733 05/25/22  1500 05/25/22  1135   NA  --   --   --   --   --  140  --  139  --  141  --  142  142   POTASSIUM  --   --  4.5  --   --  3.4  --  3.4  --  3.6  --  3.4  3.4   CHLORIDE  --   --   --   --   --  107  --  106  --  105  --  105  105   CO2  --   --   --    --   --  18*  --  24  --  26  --  26  26   ANIONGAP  --   --   --   --   --  15*  --  9  --  10  --  11  11   GLC  --  155*  --  118* 117* 146*   < > 131*   < > 139*   < > 143*   BUN  --   --   --   --   --  18  --  19  --  20  --  20   CR  --   --   --   --   --  1.04  --  1.00  --  0.99  --  1.13*   GFRESTIMATED  --   --   --   --   --  66  --  69  --  70  --  59*   MICHEAL  --   --   --   --   --  7.1*  --  7.1*  --  7.2*  --  7.1*   MAG 2.6*  --   --   --   --  2.9*  --  1.5*  --  1.6  --  1.6   PHOS 2.8  --   --   --   --  2.4*  --  2.7  --  3.4  --   --    PROTTOTAL  --   --   --   --   --  5.2*  --  5.7*  --  5.7*  --  5.9*   ALBUMIN  --   --   --   --   --  2.0*  --  2.2*  --  2.3*  --  2.4*   BILITOTAL  --   --   --   --   --  1.2  --  1.4*  --  1.6*  --  1.7*   ALKPHOS  --   --   --   --   --  114  --  120  --  126  --  136   AST  --   --   --   --   --  1,717*  --  1,750*  --  >1,000*  --  887*   ALT  --   --   --   --   --  1,230*  --  1,109*  --  833*  --  451*    < > = values in this interval not displayed.     CBC  Recent Labs   Lab 05/26/22  1133 05/26/22  0507 05/25/22  2317 05/25/22  1733   WBC 16.1* 19.4* 16.7* 19.4*   RBC 3.79* 3.98 4.37 4.45   HGB 11.4* 11.9 12.9 13.1   HCT 35.4 37.1 40.1 41.7   MCV 93 93 92 94   MCH 30.1 29.9 29.5 29.4   MCHC 32.2 32.1 32.2 31.4*   RDW 16.5* 16.0* 15.9* 16.0*    176 151 193     INR  Recent Labs   Lab 05/26/22  1133 05/26/22  0507 05/25/22  2317 05/25/22  1733   INR 1.44* 1.45* 1.51* 1.62*     Arterial Blood Gas  Recent Labs   Lab 05/26/22  1134 05/26/22  0512 05/26/22  0251 05/25/22  2321   PH 7.36 7.49* 7.54* 7.54*   PCO2 37 27* 25* 28*   PO2 126* 89 97 121*   HCO3 21 21 22 24   O2PER 40 40 40 40       All cultures:  No results for input(s): CULT in the last 168 hours.    Imaging:   CT head 5/24/22  IMPRESSION:  1.  Moderate diffuse low-density changes to the brain parenchyma worrisome for diffuse acute anoxic injury. There is loss of gray-white  differentiation in the bilateral corpus stratum.  2.  No hemorrhage identified.  3.  Chronic infarct right parietal lobe.            Assessment and plan :     Lisa Marshall IS a 51 year old female admitted on 5/24/2022 for cardiac arrest, suspect to be secondary to respiratory failure apnea. She was found down and CPR was performed with ROSC, currently not requiring pressors and intubated. She is triggering the ventilator but otherwise non-responsive off sedation. CT head is consistent with anoxic brain injury findings. She will go for organ harvesting tonight.     1. Cardiac arrest    2. Acute hypoxic respiratory failure   Alkalosis resolved with sedation   - Dilaudid and lorazepam PRN ordered for extubation tonight     3. Anoxic brain injury   Organ donor    4. Lactic acidosis   5. TYRONE, mild   Improved     6. Morbid obesity   7. Leukocytosis, improving   - Continue Zosyn to cover for infectious etiology     8. Hypertension      I have personally reviewed the daily labs, imaging studies, cultures and discussed the case with referring physician and consulting physicians.     Jamie Choudhury MD    Billing: This patient is critically ill: Yes. Total critical care time today not including procedures was 35 min.

## 2022-05-26 NOTE — PLAN OF CARE
Pt hypotensive multiple times during shift, 1L LR given, additional pressor added to keep MAP >65. Pt no longer breathing over vent, became apenic while on CPAP trial. Family at bedside today to say their goodbyes. Plan for OR at 2030. [facility] on site for support. Will continue to monitor.

## 2022-05-26 NOTE — PROVIDER NOTIFICATION
Spoke with MD Sahu and advised by [facility] MILLY Bowie about low CVP and SVR. Ordered 1L bolus.     0430- CVP and SVR still low. Received order for second liter LR bolus; AM labs drawn; pending.

## 2022-05-26 NOTE — PROGRESS NOTES
Pt remained on vent t/o the day with plans to take to OR for [facility]  José Manuel Jackson, RT  5/26/2022

## 2022-05-27 LAB
ATRIAL RATE - MUSE: 99 BPM
BACTERIA ASPIRATE CULT: NORMAL
DIASTOLIC BLOOD PRESSURE - MUSE: NORMAL MMHG
INTERPRETATION ECG - MUSE: NORMAL
P AXIS - MUSE: 58 DEGREES
PR INTERVAL - MUSE: 142 MS
QRS DURATION - MUSE: 76 MS
QT - MUSE: 378 MS
QTC - MUSE: 485 MS
R AXIS - MUSE: 32 DEGREES
SYSTOLIC BLOOD PRESSURE - MUSE: NORMAL MMHG
T AXIS - MUSE: 34 DEGREES
VENTRICULAR RATE- MUSE: 99 BPM

## 2022-05-27 NOTE — DEATH PRONOUNCEMENT
MD DEATH PRONOUNCEMENT    Called to pronounce Lisa Marshall dead.    Physical Exam: Unresponsive to noxious stimuli, Spontaneous respirations absent,, Carotid pulse absent, Pupillary light reflex absent and Corneal blink reflex absent    Patient was pronounced dead at 09:27 PM, May 26, 2022.    Preliminary Cause of Death:  Compassionate extubation    Active Problems:    Anoxic brain damage (H)    Cardiac arrest (H)       Infectious disease present?: NO    Communicable disease present? (examples: HIV, chicken pox, TB, Ebola, CJD) :  NO    Multi-drug resistant organism present? (example: MRSA): NO    Please consider an autopsy if any of the following exist:  NO Unexpected or unexplained death during or following any dental, medical, or surgical diagnostic treatment procedures.   NO Death of mother at or up to seven days after delivery.     NO All  and pediatric deaths.     NO Death where the cause is sufficiently obscure to delay completion of the death certificate.   NO Deaths in which autopsy would confirm a suspected illness/condition that would affect surviving family members or recipients of transplanted organs.     The following deaths must be reported to the 's Office:  NO A death that may be due entirely or in part to any factors other than natural disease (recent surgery, recent trauma, suspected abuse/neglect).   NO A death that may be an accident, suicide, or homicide.     NO Any sudden, unexpected death in which there is no prior history of significant heart disease or any other condition associated with sudden death.   NO A death under suspicious, unusual, or unexpected circumstances.    NO Any death which is apparently due to natural causes but in which the  does not have a personal physician familiar with the patient s medical history, social, or environmental situation or the circumstances of the terminal event.   NO Any death apparently due to Sudden Infant Death Syndrome.     NO  Deaths that occur during, in association with, or as consequences of a diagnostic, therapeutic, or anesthetic procedure.   NO Any death in which a fracture of a major bone has occurred within the past (6) six months.   NO A death of persons note seen by their physician within 120 days of demise.     NO Any death in which the  was an inmate of a public institution or was in the custody of Law Enforcement personnel.   NO  All unexpected deaths of children   YES Solid organ donors   NO Unidentified bodies   NO Deaths of persons whose bodies are to be cremated or otherwise disposed of so that the bodies will later be unavailable for examination;   NO Deaths unattended by a physician outside of a licensed healthcare facility or licensed residential hospice program   NO Deaths occurring within 24 hours of arrival to a health care facility if death is unexpected.    NO Deaths associated with the decedent s employment.   NO Deaths attributed to acts of terrorism.   NO Any death in which there is uncertainty as to whether it is a medical examiner s care should be discussed with the medical investigator.        Body disposition: Organ donation was discussed with family memberS.  Permission for organ donation was obtained.

## 2022-05-27 NOTE — ANESTHESIA PREPROCEDURE EVALUATION
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 Lujan at [facility]      Past Surgical History:   Procedure Laterality Date     HERNIA REPAIR      ventral hernia repair      SURGICAL HISTORY OF -   1990    wisdom teeth     THORACOSCOPIC WEDGE RESECTION LUNG  4/3/2012    Procedure:THORACOSCOPIC WEDGE RESECTION LUNG; RIGHT VIDEO ASSISTED THORACOSCOPY, WEDGE RESECTION; Surgeon:XAVIER GAONA; Location: OR     [facility] NONSPECIFIC PROCEDURE  06    sinus surgery      Allergies   Allergen Reactions     Topiramate      Other reaction(s): Intraocular Pressure Increase  ?tried for facial pain, d/c'd for seeing multiples x hrs (Dr. Lujan), closed angle glaucoma      Social History     Tobacco Use     Smoking status: Never Smoker     Smokeless tobacco: Never Used   Substance Use Topics     Alcohol use: Yes     Comment: social      Wt Readings from Last 1 Encounters:   22 106.8 kg (235 lb 7.2 oz)        Anesthesia Evaluation            ROS/MED HX  ENT/Pulmonary: Comment: Intubated on scene    (+) asthma     Neurologic: Comment: Anoxic brain injury      Cardiovascular: Comment: S/p Cardiac arrest - anoxic brain injury    (+) hypertension-----    METS/Exercise Tolerance:     Hematologic:        Musculoskeletal:       GI/Hepatic:       Renal/Genitourinary:       Endo:     (+) Obesity,     Psychiatric/Substance Use:       Infectious Disease:       Malignancy:       Other:            Physical Exam    Airway   unable to assess   Comment: ETT in situ         Respiratory Devices and Support         Dental    unable to assess        Cardiovascular             Pulmonary                   OUTSIDE LABS:  CBC:   Lab Results   Component Value Date    WBC 23.6 (H) 05/26/2022    WBC 16.1 (H) 05/26/2022    HGB 11.8 05/26/2022    HGB 11.4 (L) 05/26/2022    HCT 38.3 05/26/2022    HCT 35.4 05/26/2022     05/26/2022     05/26/2022     BMP:   Lab Results   Component Value Date     05/26/2022     05/26/2022    POTASSIUM 5.3 05/26/2022    POTASSIUM 4.4 05/26/2022    CHLORIDE 115 (H) 05/26/2022    CHLORIDE 108 05/26/2022    CO2 20 05/26/2022    CO2 21 05/26/2022    BUN 14 05/26/2022    BUN 17 05/26/2022    CR 1.13 (H) 05/26/2022    CR 1.09 (H) 05/26/2022     (H) 05/26/2022     (H) 05/26/2022     COAGS:   Lab Results   Component Value Date    PTT 31 05/26/2022    INR 1.28 (H) 05/26/2022    FIBR 355 05/25/2022     POC:   Lab Results   Component Value Date    HCG Negative 11/15/2008     HEPATIC:   Lab Results   Component Value Date    ALBUMIN 2.0 (L) 05/26/2022    PROTTOTAL 5.5 (L) 05/26/2022    ALT 1,131 (HH) 05/26/2022     (HH) 05/26/2022     (H) 05/26/2022    ALKPHOS 134 05/26/2022    BILITOTAL 1.3 05/26/2022     OTHER:   Lab Results   Component Value Date    PH 7.21 (L) 05/26/2022    LACT 1.3 05/26/2022    A1C 6.4 (H) 05/25/2022    MICHEAL 7.3 (L) 05/26/2022    PHOS 4.0 05/26/2022    MAG 2.6 (H) 05/26/2022    LIPASE 141 05/26/2022    AMYLASE 215 (H) 05/26/2022    TSH 3.35 09/30/2008       Anesthesia Plan    ASA Status:  6      Anesthesia Type: General.     - Airway: ETT              Consents            Postoperative Care            Comments:                Reji Velasquez  Linh, DO, DO

## 2022-05-27 NOTE — OR NURSING
At end of procedure, patient prepared for transport with all lines removed by ICU RN and OR staff. Handoff given to security personnel Donis Chowdhury at 2348, who transported the patient out of the OR to the [facility] at 2352. Patient chart was given to Donis, who said he will bring chart to nursing supervisor on site. ANS notified via phone call at 2354 of patient transport to [facility] and expectation to receive chart from Donis.   The patient is a 74y Female complaining of chest pain.

## 2022-05-27 NOTE — PROGRESS NOTES
Patient was brought to OR. Pressors turned off at 2122 per MD enrique and comfort care pain and anxiety medication was given to facilitate compassionate extubations; see Note from MD enrique for time of death.

## 2022-05-27 NOTE — DISCHARGE SUMMARY
Physician Discharge Summary     Patient ID:  Lisa Marshall  [id number]  51 year old  1970    Admit date: 2022    Discharge date and time: 2022 11:52 PM     Admitting Physician: Isra Young MD     Discharge Physician: Jamie Choudhury MD    Admission Diagnoses: Anoxic brain damage (H) [G93.1]  Cardiac arrest (H) [I46.9]  Closed fracture of multiple ribs of left side, initial encounter [S22.42XA]  Aspiration Pneumonia/Pneumonitis      Discharge Diagnoses: Same    Admission Condition: critical    Discharged Condition:      Indication for Admission: Cardiac arrest     Hospital Course: Patient is 51F with known DM II, HTN and schizophrenia was found down at her assisted living. She was apneic and pulseless, and CPR was performed for around 10 minutes, receiving 3 rounds of epi and 1 amp of bicarb. ROSC was achieved and she arrived intubated. She was triggering the ventilator but no cranial reflexes off sedation were elicited per neurology. CT head was consistent with severe anoxic brain injury. Family proceeded with organ donation and she went to OR on  at night for harvesting. She was pronounced dead in the OR.     Consults: neurology    Significant Diagnostic Studies: radiology: CT scan head: severe anoxic brain injury findings.      Treatments: IV hydration, heparin gtt.}    Discharge Exam:       Disposition:      Patient Instructions: N/A     Activity: N/A   Diet: N/A  Follow-up: N/A     Signed:  Jamie Choudhury MD  2022  9:42 AM

## 2022-05-28 LAB
BACTERIA BLD CULT: ABNORMAL

## 2022-05-30 LAB
BACTERIA BLD CULT: ABNORMAL
BACTERIA BLD CULT: NO GROWTH

## (undated) DEVICE — LIGHT HANDLE X2

## (undated) DEVICE — PACK AAA SBA15AAFS3

## (undated) DEVICE — PREP CHLORAPREP 26ML TINTED ORANGE  260815

## (undated) DEVICE — SU ETHIBOND 5 V-37 4X30" MB66G

## (undated) DEVICE — DEVICE TISSUE STABILIZATION OCTOBASE 28707

## (undated) DEVICE — SU UMBILICAL TAPE .125X30" U11T

## (undated) DEVICE — BLADE SAW STRK STERNAL 6207-97-101

## (undated) DEVICE — INSERT FOGARTY 86MM TRACTION DBL SAFEJAW DSAFE86

## (undated) DEVICE — SUCTION TIP POOLE K770

## (undated) DEVICE — SU PROLENE 4-0 RB-1DA 36" 8557H

## (undated) DEVICE — DRAPE STERI INCISE 51X47" 1051

## (undated) DEVICE — BASIN SET MINOR DISP

## (undated) DEVICE — CLIP ETHICON LIGACLIP LG YELLOW LT400

## (undated) DEVICE — COVER TABLE POLY 65X90" 8186

## (undated) DEVICE — MANIFOLD NEPTUNE 4 PORT 700-20

## (undated) DEVICE — STPL SKIN 35W 6.9MM  PXW35

## (undated) DEVICE — ESU PENCIL W/HOLSTER E2350H

## (undated) DEVICE — SPONGE LAP 18X18" X8435

## (undated) DEVICE — LINEN TOWEL PACK X30 5481

## (undated) DEVICE — SOL NACL 0.9% IRRIG 1000ML BOTTLE 2F7124

## (undated) DEVICE — TUBING SUCTION MEDI-VAC SOFT 3/16"X20' N520A

## (undated) DEVICE — SUCTION TIP YANKAUER STR K87

## (undated) DEVICE — SU ETHIBOND 2 LR 20" X406T

## (undated) DEVICE — ESU GROUND PAD ADULT W/CORD E7507

## (undated) DEVICE — SOL WATER IRRIG 1000ML BOTTLE 2F7114

## (undated) DEVICE — DRAPE SLUSH/WARMER 66X44" ORS-320